# Patient Record
Sex: FEMALE | Race: WHITE | NOT HISPANIC OR LATINO | Employment: FULL TIME | URBAN - METROPOLITAN AREA
[De-identification: names, ages, dates, MRNs, and addresses within clinical notes are randomized per-mention and may not be internally consistent; named-entity substitution may affect disease eponyms.]

---

## 2020-11-11 ENCOUNTER — HISTORICAL (OUTPATIENT)
Dept: RESPIRATORY THERAPY | Facility: HOSPITAL | Age: 25
End: 2020-11-11

## 2023-08-30 DIAGNOSIS — Z34.90 PREGNANCY: Primary | ICD-10-CM

## 2023-09-25 NOTE — PROGRESS NOTES
Chief Complaint:  Initial Prenatal Visit    History of Present Illness:  Bri Eason is a 28 y.o. year old  presents for her new ob at 9w1d by LMP 23. She is doing well.       LMP: 23  Frequency: monthly  Cycle Length: 5 days   Flow: heavy to moderate  Intermenstrual Bleeding: No  Postcoital Bleeding: No  Dysmenorrhea: Yes. moderate  Sexually Active: yes   Dyspareunia: No  Contraception: no, pregnant  H/o STI: No   Last pap:  per Pt  H/o abnl pap: No   Colposcopy: no  Gardasil: n/a   MMG: n/a  H/o abnl MMG: n/a  Colonoscopy: n/a      Review of Systems:  General/Constitutional: Chills denies. Fatigue/weakness denies. Fever denies. Night sweats denies. Hot flashes denies.   Respiratory: Cough denies. Hemoptysis denies. SOB denies. Sputum production denies. Wheezing denies.   Cardiovascular: Chest pain denies. Dizziness denies. Palpitations denies. Swelling in hands/feet denies.    Gastrointestinal: Abdominal pain denies. Blood in stool denies. Constipation denies. Diarrhea denies. Heartburn denies. Nausea denies. Vomiting denies.   Genitourinary: Incontinence denies. Blood in urine denies. Frequent urination denies. Painful urination denies.  Urinary urgency denies.  Nocturia denies.   Gynecologic: Irregular menses denies. Heavy bleeding denies. Painful menses denies. Vaginal discharge denies. Vaginal odor denies. Vaginal itching denies. Vaginal lesion denies.  Pelvic pain denies. Decreased libido denies. Vulvar lesion denies. Prolapse of genital organs denies. Painful intercourse denies. Postcoital bleeding denies.   Psychiatric: Depression denies. Anxiety denies.     OB History    Para Term  AB Living   1 0 0 0 0 0   SAB IAB Ectopic Multiple Live Births   0 0 0 0 0      # 1 - Date: None, Sex: None, Weight: None, GA: None, Delivery: None, Apgar1: None, Apgar5: None, Living: None, Birth Comments: None      Past Medical History:   Diagnosis Date    Anxiety     Depression         Current Outpatient Medications:     FLUoxetine 20 MG capsule, Take 40 mg by mouth every evening., Disp: , Rfl:     PRENATAL 28 mg iron- 800 mcg Tab, Take 1 tablet by mouth., Disp: , Rfl:     Review of patient's allergies indicates:  No Known Allergies    Past Surgical History:   Procedure Laterality Date    TONSILLECTOMY      WISDOM TOOTH EXTRACTION  2018     Family History   Problem Relation Age of Onset    Breast cancer Mother 50    Seizures Sister     Cancer Maternal Grandfather     Stroke Paternal Grandfather     Ovarian cancer Neg Hx     Uterine cancer Neg Hx     Cervical cancer Neg Hx     Colon cancer Neg Hx      Social History     Socioeconomic History    Marital status:    Tobacco Use    Smoking status: Never     Passive exposure: Never    Smokeless tobacco: Never   Substance and Sexual Activity    Alcohol use: Not Currently    Drug use: Never    Sexual activity: Yes     Partners: Male     Birth control/protection: None       Physical Exam:  /70   Wt 103.7 kg (228 lb 9.6 oz)   LMP 07/24/2023     Chaperone: present.       General appearance: healthy, well-nourished and well-developed     Psychiatric: Orientation to time, place and person. Normal mood and affect and active, alert     Skin:   Appearance: no rashes or lesions.     Neck:   Neck: supple, FROM, trachea midline. and no masses   Thyroid: no enlargement or nodules and non-tender.       Cardiovascular:   Auscultation: RRR and no murmur.   Peripheral Vascular: no varicosities, LLE edema, RLE edema, calf tenderness, and palpable cord and pedal pulses intact.     Lungs:   Respiratory effort: no intercostal retractions or accessory muscle usage.   Auscultation: no wheezing, rales/crackles, or rhonchi and clear to auscultation.     Breast:   Inspection/Palpation: no tenderness, discrete/distinct masses, skin changes, or abnormal secretions. Nipple appearance normal.     Abdomen:   Auscultation/Inspection/Palpation: no hepatomegaly,  splenomegaly, masses, tenderness or CVA tenderness and soft, non-distended bowel sounds preset.    Hernia: no palpable hernias.     Female Genitalia:   Vulva: no masses, tenderness or lesions   Bladder/Urethra: no urethral discharge or mass, normal meatus, bladder non-distended.   Vagina: no tenderness, erythema, cystocele, rectocele, abnormal vaginal discharge or vesicle(s) or ulcers   Cervix: no discharge, no cervical lacerations noted or motion tenderness and grossly normal   Uterus: normal size and shape and midline, non-tender, and no uterine prolapse.   Adnexa/Parametria: no parametrial tenderness or mass, no adnexal tenderness or ovarian mass.     Lymph Nodes:   Palpation: non tender submandibular nodes, axillary nodes, or inguinal nodes.     Rectal Exam:   Rectum: normal perianal skin.       Assessment/Plan:  1. Maternal obesity affecting pregnancy, antepartum  Patient educated on risks and complications of obesity and pregnancy including but not limited to first trimester miscarriage recurrent miscarriages, congenital anomalies, HTN disorders, gestational diabetes, macrosomia, PTL&D, higher risk of fetal demise in-utero and higher risk of CS (and wound complication including infection breakdown) with obesity.    The [place] preventative task force placed the following recommendations in 2016 which ACOG supports regarding utilization of low dose 81mg ASA starting at 14 weeks and continuing through pregnancy in high risk pregnant women. The utilization of this has been shown to reduce the risk for preeclampsia by 24% in clinical trials and reduce the risk of  birth by 14% and IUGR about 20%.     Begin 81mg ASA @12-14wks    2. Anxiety in pregnancy, antepartum  Educated     Counseling information given for Sherie Garrison [facility] [phone]     HI & SI precautions  Desires medication     Rx: Vistaril 25 mg PRN    3. Bloating  Educated  Food diary, log symptoms    4. 9 weeks gestation of pregnancy  We discussed  diet/supplements and modifiable risk factors.     Patients advised to continue moderate physical activity.       Patients will report unusual headaches, visual disturbances, pelvic pain or cramping, vaginal bleeding, rupture of membranes, extreme swelling in hands or face, diminished urine volume, any prolonged illness or infection, or persistent symptoms of labor.     Discussed with patient on the uncertainty of COVID 19 in relation to pregnancy complications with patient and unborn fetus.  Advised to CDC guidelines of social distancing, patient to stay home as much as possible, and limit contact with others.  Travel restrictions discussed.  Patient to call office if she has a temperature over 100.4 cough shortness of breath or GI symptoms. Patients educated if she feels she is in an emergency to call 911.      Educated patient it is strongly advised by American Congress of Ob[employer]s [employer]ne[employer] as well as Society for Maternal Fetal [employer] Specialists to receive the COVID 19 vaccine. Educated patient that pregnant patients are at high risk of severe COVID 19 infections, including but not limited to death.     +FHT   GA: by LMP; 8w3d by US  NANCY: 5/4/2024  PNL, PNV, folic acid   Pap gc/cz/tv   RTC 1 month

## 2023-09-26 ENCOUNTER — INITIAL PRENATAL (OUTPATIENT)
Dept: OBSTETRICS AND GYNECOLOGY | Facility: CLINIC | Age: 28
End: 2023-09-26
Payer: COMMERCIAL

## 2023-09-26 VITALS
WEIGHT: 228.63 LBS | SYSTOLIC BLOOD PRESSURE: 126 MMHG | BODY MASS INDEX: 36.74 KG/M2 | DIASTOLIC BLOOD PRESSURE: 70 MMHG | HEIGHT: 66 IN

## 2023-09-26 DIAGNOSIS — F41.9 ANXIETY IN PREGNANCY, ANTEPARTUM: ICD-10-CM

## 2023-09-26 DIAGNOSIS — R14.0 BLOATING: ICD-10-CM

## 2023-09-26 DIAGNOSIS — O99.340 ANXIETY IN PREGNANCY, ANTEPARTUM: ICD-10-CM

## 2023-09-26 DIAGNOSIS — O99.210 MATERNAL OBESITY AFFECTING PREGNANCY, ANTEPARTUM: ICD-10-CM

## 2023-09-26 DIAGNOSIS — Z3A.09 9 WEEKS GESTATION OF PREGNANCY: ICD-10-CM

## 2023-09-26 DIAGNOSIS — Z34.91 FIRST TRIMESTER PREGNANCY: Primary | ICD-10-CM

## 2023-09-26 DIAGNOSIS — Z11.3 SCREENING EXAMINATION FOR VENEREAL DISEASE: ICD-10-CM

## 2023-09-26 LAB
BILIRUB UR QL STRIP: NEGATIVE
GLUCOSE UR QL STRIP: NEGATIVE
KETONES UR QL STRIP: NEGATIVE
LEUKOCYTE ESTERASE UR QL STRIP: NEGATIVE
PH, POC UA: 7
POC BLOOD, URINE: NEGATIVE
POC NITRATES, URINE: NEGATIVE
PROT UR QL STRIP: NEGATIVE
SP GR UR STRIP: 1.02 (ref 1–1.03)
UROBILINOGEN UR STRIP-ACNC: NORMAL (ref 0.1–1.1)

## 2023-09-26 PROCEDURE — 87661 TRICHOMONAS VAGINALIS AMPLIF: CPT | Performed by: OBSTETRICS & GYNECOLOGY

## 2023-09-26 PROCEDURE — 0500F INITIAL PRENATAL CARE VISIT: CPT | Mod: ,,, | Performed by: OBSTETRICS & GYNECOLOGY

## 2023-09-26 PROCEDURE — 87491 CHLMYD TRACH DNA AMP PROBE: CPT | Performed by: OBSTETRICS & GYNECOLOGY

## 2023-09-26 PROCEDURE — 76801 US OB/GYN EXTENDED PROCEDURE (VIEWPOINT): ICD-10-PCS | Mod: 26,,, | Performed by: OBSTETRICS & GYNECOLOGY

## 2023-09-26 PROCEDURE — 87591 N.GONORRHOEAE DNA AMP PROB: CPT | Performed by: OBSTETRICS & GYNECOLOGY

## 2023-09-26 PROCEDURE — 87088 URINE BACTERIA CULTURE: CPT | Performed by: OBSTETRICS & GYNECOLOGY

## 2023-09-26 PROCEDURE — 0500F PR INITIAL PRENATAL CARE VISIT: ICD-10-PCS | Mod: ,,, | Performed by: OBSTETRICS & GYNECOLOGY

## 2023-09-26 PROCEDURE — 76801 OB US < 14 WKS SINGLE FETUS: CPT | Mod: 26,,, | Performed by: OBSTETRICS & GYNECOLOGY

## 2023-09-26 RX ORDER — PNV NO.95/FERROUS FUM/FOLIC AC 28MG-0.8MG
1 TABLET ORAL
COMMUNITY
Start: 2023-09-03

## 2023-09-26 RX ORDER — FLUOXETINE HYDROCHLORIDE 20 MG/1
40 CAPSULE ORAL NIGHTLY
COMMUNITY
Start: 2023-06-30 | End: 2023-10-24

## 2023-09-26 RX ORDER — HYDROXYZINE PAMOATE 25 MG/1
25 CAPSULE ORAL EVERY 6 HOURS PRN
Qty: 30 CAPSULE | Refills: 0 | Status: SHIPPED | OUTPATIENT
Start: 2023-09-26 | End: 2024-02-20 | Stop reason: SDUPTHER

## 2023-09-27 LAB
C TRACH RRNA SPEC QL NAA+PROBE: NEGATIVE
N GONORRHOEA RRNA SPEC QL NAA+PROBE: NEGATIVE
SPECIMEN SOURCE: NORMAL
T VAGINALIS RRNA SPEC QL NAA+PROBE: NEGATIVE

## 2023-09-29 ENCOUNTER — LAB VISIT (OUTPATIENT)
Dept: LAB | Facility: HOSPITAL | Age: 28
End: 2023-09-29
Attending: OBSTETRICS & GYNECOLOGY
Payer: COMMERCIAL

## 2023-09-29 DIAGNOSIS — Z3A.09 9 WEEKS GESTATION OF PREGNANCY: ICD-10-CM

## 2023-09-29 LAB
ABO AND RH: NORMAL
ANTIBODY SCREEN: NORMAL
BACTERIA UR CULT: NO GROWTH
ERYTHROCYTE [DISTWIDTH] IN BLOOD BY AUTOMATED COUNT: 13.6 % (ref 11–14.5)
GLUCOSE 1H P 100 G GLC PO SERPL-MCNC: 102 MG/DL
HBV SURFACE AG SERPL QL IA: NEGATIVE
HBV SURFACE AG SERPL QL IA: NORMAL
HCT VFR BLD AUTO: 40.8 % (ref 36–48)
HGB BLD-MCNC: 13.1 G/DL (ref 11.8–16)
MCH RBC QN AUTO: 27 PG (ref 27–34)
MCHC RBC AUTO-ENTMCNC: 32.1 G/DL (ref 31–37)
MCV RBC AUTO: 84 FL (ref 79–99)
NRBC BLD AUTO-RTO: 0 %
PLATELET # BLD AUTO: 230 X10(3)/MCL (ref 140–371)
PMV BLD AUTO: 9.1 FL (ref 9.4–12.4)
RBC # BLD AUTO: 4.86 X10(6)/MCL (ref 4–5.1)
RUBELLA AB, IGG (OHS): 19 IU/ML
SPECIMEN OUTDATE: NORMAL
WBC # SPEC AUTO: 10.28 X10(3)/MCL (ref 4–11.5)

## 2023-09-29 PROCEDURE — 82950 GLUCOSE TEST: CPT

## 2023-09-29 PROCEDURE — 86900 BLOOD TYPING SEROLOGIC ABO: CPT | Performed by: OBSTETRICS & GYNECOLOGY

## 2023-09-29 PROCEDURE — 36415 COLL VENOUS BLD VENIPUNCTURE: CPT

## 2023-09-29 PROCEDURE — 86787 VARICELLA-ZOSTER ANTIBODY: CPT

## 2023-09-29 PROCEDURE — 86803 HEPATITIS C AB TEST: CPT

## 2023-09-29 PROCEDURE — 86762 RUBELLA ANTIBODY: CPT

## 2023-09-29 PROCEDURE — 87389 HIV-1 AG W/HIV-1&-2 AB AG IA: CPT

## 2023-09-29 PROCEDURE — 86780 TREPONEMA PALLIDUM: CPT

## 2023-09-29 PROCEDURE — 85027 COMPLETE CBC AUTOMATED: CPT

## 2023-09-29 PROCEDURE — 87340 HEPATITIS B SURFACE AG IA: CPT

## 2023-09-29 PROCEDURE — 83020 HEMOGLOBIN ELECTROPHORESIS: CPT

## 2023-09-30 LAB — T PALLIDUM AB SER QL: NONREACTIVE

## 2023-10-02 LAB
HCV AB SERPL QL IA: NONREACTIVE
HIV 1+2 AB+HIV1 P24 AG SERPL QL IA: NONREACTIVE

## 2023-10-04 LAB
HGB A MFR BLD ELPH: 97.5 % (ref 95.8–98)
HGB A2 MFR BLD ELPH: 2.5 % (ref 2–3.3)
HGB F MFR BLD ELPH: 0 % (ref 0–0.9)
HGB FRACT BLD ELPH-IMP: NORMAL
M HPLC HB VARIANT, B: NORMAL
VZV IGG SER IA-ACNC: 3.4
VZV IGG SER QL IA: POSITIVE

## 2023-10-23 NOTE — PROGRESS NOTES
"Chief Complaint:  Routine Prenatal Visit    History of Present Illness:  Bri Eason is a 28 y.o. year old  at 13w1d presents for her ob exam. She is doing well. No complaints today.       Review of Systems:  General/Constitutional: Fever denies. Headache denies.     Skin: Rash denies.   Respiratory: Cough denies. SOB denies. Wheezing denies .   Cardiovascular: Chest pain denies. Dizziness denies. Palpitations denies. Swelling in hands/feet denies.    Gastrointestinal: Abdominal pain denies. Constipation denies. Diarrhea denies. Heartburn denies. Nausea denies. Vomiting denies.  Genitourinary: Painful urination denies.   Gynecologic: Vaginal bleeding denies. Vaginal discharge Normal. Leaking amniotic fluid denies.  Contractions denies.  Psychiatric: Depressed mood denies. Suicidal thoughts denies.     OB History    Para Term  AB Living   1 0 0 0 0 0   SAB IAB Ectopic Multiple Live Births   0 0 0 0 0      # 1 - Date: None, Sex: None, Weight: None, GA: None, Delivery: None, Apgar1: None, Apgar5: None, Living: None, Birth Comments: None        Current Outpatient Medications:     hydrOXYzine pamoate (VISTARIL) 25 MG Cap, Take 1 capsule (25 mg total) by mouth every 6 (six) hours as needed (anxiety)., Disp: 30 capsule, Rfl: 0    PRENATAL 28 mg iron- 800 mcg Tab, Take 1 tablet by mouth., Disp: , Rfl:     FLUoxetine 20 MG capsule, Take 40 mg by mouth every evening., Disp: , Rfl:     Physical Exam:  /74   Temp 97.2 °F (36.2 °C)   Ht 5' 6" (1.676 m)   Wt 103.7 kg (228 lb 9.6 oz)   LMP 2023   BMI 36.90 kg/m²     Constitutional: General appearance: healthy, well-nourished and well-developed   Psychiatric:  Orientation to time, place and person. Normal mood and affect and active, alert   Abdomen: Auscultation/Inspection/Palpation: Gravid. No tenderness or masses. Soft, nondistended   Extremities: no CCE    FHT: 140      Assessment/Plan  1. Other obesity affecting pregnancy, " antepartum  Patient educated on risks and complications of obesity and pregnancy including but not limited to first trimester miscarriage recurrent miscarriages, congenital anomalies, HTN disorders, gestational diabetes, macrosomia, PTL&D, higher risk of fetal demise in-utero and higher risk of CS (and wound complication including infection breakdown) with obesity.     The [place] preventative task force placed the following recommendations in 2016 which ACOG supports regarding utilization of low dose 81mg ASA starting at 14 weeks and continuing through pregnancy in high risk pregnant women. The utilization of this has been shown to reduce the risk for preeclampsia by 24% in clinical trials and reduce the risk of  birth by 14% and IUGR about 20%.      Begin 81mg ASA @12-14wks    2. Anxiety in pregnancy, antepartum  Educated     Counseling information given for Sherie Garrison [facility] [phone]     HI & SI precautions  Vistaril 25 mg PRN    3. Rh negative state in antepartum period  Rhogam @28wks    4. Breast cancer in mother  Educated  Mother BRCA+. Pt received her results, pt is BRCA negative    5. 13 weeks gestation of pregnancy  Instructed on weight gain, healthy exercise, vitamins, avoidance of drugs tobacco and alcohol. Pain, fever, bleeding precautions.        Discussed with patient travel precautions.       Discussed with patient on the uncertainty of COVID 19 in relation to pregnancy complications with patient and unborn fetus.  Advised to CDC guidelines of social distancing, patient to stay home as much as possible, and limit contact with others.  Travel restrictions discussed.  Patient to call office if she has a temperature over 100.4 cough shortness of breath or GI symptoms. Patient educated if she feels she is in an emergency to call 911.        Educated patient it is strongly advised by American Congress of Obstetrics and Gyne[employer] as well as Society for Maternal Fetal Medicine Specialists to  receive the COVID 19 vaccine. Educated patient that pregnant patients are at high risk of severe COVID 19 infections, including but not limited to death.     Desires genetic testing, order given. Will call insurance and ivitae regarding price  RTC 1 mo

## 2023-10-24 ENCOUNTER — ROUTINE PRENATAL (OUTPATIENT)
Dept: OBSTETRICS AND GYNECOLOGY | Facility: CLINIC | Age: 28
End: 2023-10-24
Payer: COMMERCIAL

## 2023-10-24 VITALS
WEIGHT: 228.63 LBS | SYSTOLIC BLOOD PRESSURE: 128 MMHG | HEIGHT: 66 IN | TEMPERATURE: 97 F | BODY MASS INDEX: 36.74 KG/M2 | DIASTOLIC BLOOD PRESSURE: 74 MMHG

## 2023-10-24 DIAGNOSIS — F41.9 ANXIETY IN PREGNANCY, ANTEPARTUM: ICD-10-CM

## 2023-10-24 DIAGNOSIS — O99.340 ANXIETY IN PREGNANCY, ANTEPARTUM: ICD-10-CM

## 2023-10-24 DIAGNOSIS — Z3A.13 13 WEEKS GESTATION OF PREGNANCY: ICD-10-CM

## 2023-10-24 DIAGNOSIS — E66.8 OTHER OBESITY AFFECTING PREGNANCY, ANTEPARTUM: ICD-10-CM

## 2023-10-24 DIAGNOSIS — O26.899 RH NEGATIVE STATE IN ANTEPARTUM PERIOD: ICD-10-CM

## 2023-10-24 DIAGNOSIS — O99.210 OTHER OBESITY AFFECTING PREGNANCY, ANTEPARTUM: ICD-10-CM

## 2023-10-24 DIAGNOSIS — Z67.91 RH NEGATIVE STATE IN ANTEPARTUM PERIOD: ICD-10-CM

## 2023-10-24 LAB
BILIRUB UR QL STRIP: NORMAL
GLUCOSE UR QL STRIP: NEGATIVE
KETONES UR QL STRIP: NORMAL
LEUKOCYTE ESTERASE UR QL STRIP: NEGATIVE
PH, POC UA: NORMAL
POC BLOOD, URINE: NORMAL
POC NITRATES, URINE: NEGATIVE
PROT UR QL STRIP: NEGATIVE
SP GR UR STRIP: NORMAL (ref 1–1.03)
UROBILINOGEN UR STRIP-ACNC: NORMAL (ref 0.3–2.2)

## 2023-10-24 PROCEDURE — 0502F SUBSEQUENT PRENATAL CARE: CPT | Mod: CPTII,,, | Performed by: OBSTETRICS & GYNECOLOGY

## 2023-10-24 PROCEDURE — 0502F PR SUBSEQUENT PRENATAL CARE: ICD-10-PCS | Mod: CPTII,,, | Performed by: OBSTETRICS & GYNECOLOGY

## 2023-11-28 ENCOUNTER — ROUTINE PRENATAL (OUTPATIENT)
Dept: OBSTETRICS AND GYNECOLOGY | Facility: CLINIC | Age: 28
End: 2023-11-28
Payer: COMMERCIAL

## 2023-11-28 VITALS — WEIGHT: 231.69 LBS | BODY MASS INDEX: 37.4 KG/M2 | DIASTOLIC BLOOD PRESSURE: 70 MMHG | SYSTOLIC BLOOD PRESSURE: 118 MMHG

## 2023-11-28 DIAGNOSIS — E66.8 OTHER OBESITY AFFECTING PREGNANCY, ANTEPARTUM: Primary | ICD-10-CM

## 2023-11-28 DIAGNOSIS — Z3A.18 18 WEEKS GESTATION OF PREGNANCY: ICD-10-CM

## 2023-11-28 DIAGNOSIS — O26.899 RH NEGATIVE STATE IN ANTEPARTUM PERIOD: ICD-10-CM

## 2023-11-28 DIAGNOSIS — Z67.91 RH NEGATIVE STATE IN ANTEPARTUM PERIOD: ICD-10-CM

## 2023-11-28 DIAGNOSIS — O99.340 ANXIETY IN PREGNANCY, ANTEPARTUM: ICD-10-CM

## 2023-11-28 DIAGNOSIS — F41.9 ANXIETY IN PREGNANCY, ANTEPARTUM: ICD-10-CM

## 2023-11-28 DIAGNOSIS — O99.210 OTHER OBESITY AFFECTING PREGNANCY, ANTEPARTUM: Primary | ICD-10-CM

## 2023-11-28 PROCEDURE — 90471 IMMUNIZATION ADMIN: CPT | Mod: ,,, | Performed by: OBSTETRICS & GYNECOLOGY

## 2023-11-28 PROCEDURE — 0502F PR SUBSEQUENT PRENATAL CARE: ICD-10-PCS | Mod: CPTII,,, | Performed by: OBSTETRICS & GYNECOLOGY

## 2023-11-28 PROCEDURE — 90686 FLU VACCINE (QUAD) GREATER THAN OR EQUAL TO 3YO PRESERVATIVE FREE IM: ICD-10-PCS | Mod: ,,, | Performed by: OBSTETRICS & GYNECOLOGY

## 2023-11-28 PROCEDURE — 90471 FLU VACCINE (QUAD) GREATER THAN OR EQUAL TO 3YO PRESERVATIVE FREE IM: ICD-10-PCS | Mod: ,,, | Performed by: OBSTETRICS & GYNECOLOGY

## 2023-11-28 PROCEDURE — 0502F SUBSEQUENT PRENATAL CARE: CPT | Mod: CPTII,,, | Performed by: OBSTETRICS & GYNECOLOGY

## 2023-11-28 PROCEDURE — 90686 IIV4 VACC NO PRSV 0.5 ML IM: CPT | Mod: ,,, | Performed by: OBSTETRICS & GYNECOLOGY

## 2023-11-28 NOTE — PROGRESS NOTES
Chief Complaint:  Routine Prenatal Visit (18.1, Denies problems today )    History of Present Illness:  Bri Eason is a 28 y.o. year old  at 18w1d presents for her ob exam. She is doing well. No complaints today.       Review of Systems:  General/Constitutional: Fever denies. Headache denies.     Skin: Rash denies.   Respiratory: Cough denies. SOB denies. Wheezing denies .   Cardiovascular: Chest pain denies. Dizziness denies. Palpitations denies. Swelling in hands/feet denies.    Gastrointestinal: Abdominal pain denies. Constipation denies. Diarrhea denies. Heartburn denies. Nausea denies. Vomiting denies.  Genitourinary: Painful urination denies.   Gynecologic: Vaginal bleeding denies. Vaginal discharge Normal. Leaking amniotic fluid denies.  Contractions denies.  Psychiatric: Depressed mood denies. Suicidal thoughts denies.     OB History    Para Term  AB Living   1 0 0 0 0 0   SAB IAB Ectopic Multiple Live Births   0 0 0 0 0      # 1 - Date: None, Sex: None, Weight: None, GA: None, Delivery: None, Apgar1: None, Apgar5: None, Living: None, Birth Comments: None        Current Outpatient Medications:     hydrOXYzine pamoate (VISTARIL) 25 MG Cap, Take 1 capsule (25 mg total) by mouth every 6 (six) hours as needed (anxiety)., Disp: 30 capsule, Rfl: 0    PRENATAL 28 mg iron- 800 mcg Tab, Take 1 tablet by mouth., Disp: , Rfl:     Physical Exam:  /70   Wt 105.1 kg (231 lb 11.3 oz)   LMP 2023   BMI 37.40 kg/m²     Constitutional: General appearance: healthy, well-nourished and well-developed   Psychiatric:  Orientation to time, place and person. Normal mood and affect and active, alert   Abdomen: Auscultation/Inspection/Palpation: Gravid. No tenderness or masses. Soft, nondistended   Extremities: no CCE    FHT: 150      Assessment/Plan  1. Other obesity affecting pregnancy, antepartum  Patient educated on risks and complications of obesity and pregnancy including but not limited to  first trimester miscarriage recurrent miscarriages, congenital anomalies, HTN disorders, gestational diabetes, macrosomia, PTL&D, higher risk of fetal demise in-utero and higher risk of CS (and wound complication including infection breakdown) with obesity.     The [place] preventative task force placed the following recommendations in 2016 which ACOG supports regarding utilization of low dose 81mg ASA starting at 14 weeks and continuing through pregnancy in high risk pregnant women. The utilization of this has been shown to reduce the risk for preeclampsia by 24% in clinical trials and reduce the risk of  birth by 14% and IUGR about 20%.     Cont ASA 81mg    2. Anxiety in pregnancy, antepartum  Educated     Counseling information given for Sherie Garrison [facility] [phone]     HI & SI precautions  Vistaril 25 mg PRN    3. Rh negative state in antepartum period  Rhogam @28wks     4. 18 weeks gestation of pregnancy  Instructed on weight gain, healthy exercise, vitamins, avoidance of drugs tobacco and alcohol. Pain, fever, bleeding precautions.        Discussed with patient travel precautions.       Discussed with patient on the uncertainty of COVID 19 in relation to pregnancy complications with patient and unborn fetus.  Advised to CDC guidelines of social distancing, patient to stay home as much as possible, and limit contact with others.  Travel restrictions discussed.  Patient to call office if she has a temperature over 100.4 cough shortness of breath or GI symptoms. Patient educated if she feels she is in an emergency to call 911.        Educated patient it is strongly advised by American Congress of Obstetrics and Gynecology as well as Society for Maternal Fetal Medicine Specialists to receive the COVID 19 vaccine. Educated patient that pregnant patients are at high risk of severe COVID 19 infections, including but not limited to death.     Anatomy us 23   Declined genetic testing  RTC 4 weeks

## 2023-12-07 ENCOUNTER — HOSPITAL ENCOUNTER (OUTPATIENT)
Dept: RADIOLOGY | Facility: HOSPITAL | Age: 28
Discharge: HOME | End: 2023-12-07
Attending: OBSTETRICS & GYNECOLOGY
Payer: COMMERCIAL

## 2023-12-07 DIAGNOSIS — Z3A.13 13 WEEKS GESTATION OF PREGNANCY: ICD-10-CM

## 2023-12-07 PROCEDURE — 76805 OB US >/= 14 WKS SNGL FETUS: CPT | Mod: TC

## 2023-12-11 ENCOUNTER — PATIENT MESSAGE (OUTPATIENT)
Dept: OTHER | Facility: OTHER | Age: 28
End: 2023-12-11
Payer: COMMERCIAL

## 2023-12-22 NOTE — PROGRESS NOTES
Chief Complaint:  Routine Prenatal Visit (22.2 weeks. )    History of Present Illness:  Bri Eason is a 28 y.o. year old  at 22w2d presents for her ob exam. She is doing well. No complaints today.       Review of Systems:  General/Constitutional: Fever denies. Headache denies.     Skin: Rash denies.   Respiratory: Cough denies. SOB denies. Wheezing denies .   Cardiovascular: Chest pain denies. Dizziness denies. Palpitations denies. Swelling in hands/feet denies.    Gastrointestinal: Abdominal pain denies. Constipation denies. Diarrhea denies. Heartburn denies. Nausea denies. Vomiting denies.  Genitourinary: Painful urination denies.   Gynecologic: Vaginal bleeding denies. Vaginal discharge Normal. Leaking amniotic fluid denies.  Contractions denies.  Psychiatric: Depressed mood denies. Suicidal thoughts denies.       Current Outpatient Medications:     hydrOXYzine pamoate (VISTARIL) 25 MG Cap, Take 1 capsule (25 mg total) by mouth every 6 (six) hours as needed (anxiety)., Disp: 30 capsule, Rfl: 0    PRENATAL 28 mg iron- 800 mcg Tab, Take 1 tablet by mouth., Disp: , Rfl:     Physical Exam:  /74   Wt 107 kg (236 lb)   LMP 2023   BMI 38.09 kg/m²     Constitutional: General appearance: healthy, well-nourished and well-developed   Psychiatric:  Orientation to time, place and person. Normal mood and affect and active, alert   Abdomen: Auscultation/Inspection/Palpation: Gravid. No tenderness or masses. Soft, nondistended   Extremities: no CCE    FHT: 140      Assessment/Plan  1. Other obesity affecting pregnancy, antepartum  Patient educated on risks and complications of obesity and pregnancy including but not limited to first trimester miscarriage recurrent miscarriages, congenital anomalies, HTN disorders, gestational diabetes, macrosomia, PTL&D, higher risk of fetal demise in-utero and higher risk of CS (and wound complication including infection breakdown) with obesity.     The [place] preventative  task force placed the following recommendations in 2016 which ACOG supports regarding utilization of low dose 81mg ASA starting at 14 weeks and continuing through pregnancy in high risk pregnant women. The utilization of this has been shown to reduce the risk for preeclampsia by 24% in clinical trials and reduce the risk of  birth by 14% and IUGR about 20%.      Cont ASA 81mg    2. Assess fetal growth  Anatomy US 23: Fetal measurements: Biparietal diameter is 4.8 cm/20 weeks 4 days/90th percentile, head circumference is 19.0 cm/21 weeks 2 days/98th percentile, abdominal circumference is 15.6 cm/20 weeks 5 days/85th percentile, femur length is 3.2 cm/19 weeks 6 days/58th percentile and the estimated fetal weight is 354 g/20 weeks 3 days/93rd percentile (based on estimated gestational age).     Repeat growth @28wks    3. Anxiety in pregnancy, antepartum  Educated     Counseling information given for Sherie Garrison [facility] [phone]     HI & SI precautions  Vistaril 25 mg PRN    4. Rh negative state in antepartum period  Rhogam @28wks     5. 22 weeks gestation of pregnancy  Instructed on weight gain, healthy exercise, vitamins, avoidance of drugs tobacco and alcohol. Pain, fever, bleeding precautions.        Discussed with patient travel precautions.    RTC 3 weeks

## 2023-12-27 ENCOUNTER — ROUTINE PRENATAL (OUTPATIENT)
Dept: OBSTETRICS AND GYNECOLOGY | Facility: CLINIC | Age: 28
End: 2023-12-27
Payer: COMMERCIAL

## 2023-12-27 VITALS — DIASTOLIC BLOOD PRESSURE: 74 MMHG | SYSTOLIC BLOOD PRESSURE: 118 MMHG | BODY MASS INDEX: 38.09 KG/M2 | WEIGHT: 236 LBS

## 2023-12-27 DIAGNOSIS — Z67.91 RH NEGATIVE STATE IN ANTEPARTUM PERIOD: ICD-10-CM

## 2023-12-27 DIAGNOSIS — O26.899 RH NEGATIVE STATE IN ANTEPARTUM PERIOD: ICD-10-CM

## 2023-12-27 DIAGNOSIS — Z3A.22 22 WEEKS GESTATION OF PREGNANCY: ICD-10-CM

## 2023-12-27 DIAGNOSIS — Z36.89 ENCOUNTER FOR ULTRASOUND TO ASSESS FETAL GROWTH: ICD-10-CM

## 2023-12-27 DIAGNOSIS — F41.9 ANXIETY IN PREGNANCY, ANTEPARTUM: ICD-10-CM

## 2023-12-27 DIAGNOSIS — O99.210 OTHER OBESITY AFFECTING PREGNANCY, ANTEPARTUM: Primary | ICD-10-CM

## 2023-12-27 DIAGNOSIS — E66.8 OTHER OBESITY AFFECTING PREGNANCY, ANTEPARTUM: Primary | ICD-10-CM

## 2023-12-27 DIAGNOSIS — O99.340 ANXIETY IN PREGNANCY, ANTEPARTUM: ICD-10-CM

## 2023-12-27 LAB
BILIRUB SERPL-MCNC: NORMAL MG/DL
BLOOD, POC UA: NORMAL
GLUCOSE UR QL STRIP: NEGATIVE
KETONES UR QL STRIP: NORMAL
LEUKOCYTE ESTERASE URINE, POC: NEGATIVE
NITRITE, POC UA: NEGATIVE
PH, POC UA: NORMAL
PROTEIN, POC: POSITIVE
SPECIFIC GRAVITY, POC UA: NORMAL
UROBILINOGEN, POC UA: NORMAL

## 2023-12-27 PROCEDURE — 0502F PR SUBSEQUENT PRENATAL CARE: ICD-10-PCS | Mod: CPTII,,, | Performed by: OBSTETRICS & GYNECOLOGY

## 2023-12-27 PROCEDURE — 0502F SUBSEQUENT PRENATAL CARE: CPT | Mod: CPTII,,, | Performed by: OBSTETRICS & GYNECOLOGY

## 2024-01-08 ENCOUNTER — PATIENT MESSAGE (OUTPATIENT)
Dept: OTHER | Facility: OTHER | Age: 29
End: 2024-01-08
Payer: COMMERCIAL

## 2024-01-22 ENCOUNTER — PATIENT MESSAGE (OUTPATIENT)
Dept: OTHER | Facility: OTHER | Age: 29
End: 2024-01-22
Payer: COMMERCIAL

## 2024-01-22 NOTE — PROGRESS NOTES
Chief Complaint:  Routine Prenatal Visit    History of Present Illness:  Bri Eason is a 28 y.o. year old  at 26w1d presents for her ob exam. She is doing well. +fetal movement. Denies vaginal bleeding, vaginal discharge, LOF or contractions. Negative preeclampsia ROS.      Review of Systems:  General/Constitutional: Fever denies. Headache denies.    Skin: Rash denies.   Respiratory: Cough denies. SOB denies. Wheezing denies .   Cardiovascular: Chest pain denies. Dizziness denies. Palpitations denies. Swelling in hands/feet denies.    Gastrointestinal: Abdominal pain denies. Constipation denies. Diarrhea denies. Heartburn denies. Nausea denies. Vomiting denies.    Genitourinary: Painful urination denies.   Gynecologic: Vaginal bleeding denies. Vaginal discharge denies. Leaking amniotic fluid denies. Contractions denies.    Psychiatric: Depressed mood denies. Suicidal thoughts denies.       Current Outpatient Medications:     hydrOXYzine pamoate (VISTARIL) 25 MG Cap, Take 1 capsule (25 mg total) by mouth every 6 (six) hours as needed (anxiety)., Disp: 30 capsule, Rfl: 0    PRENATAL 28 mg iron- 800 mcg Tab, Take 1 tablet by mouth., Disp: , Rfl:       Physical Exam:  /80   Wt 110.7 kg (244 lb)   LMP 2023   BMI 39.38 kg/m²     Constitutional: General appearance: healthy, well-nourished and well-developed   Psychiatric:  Orientation to time, place and person. Normal mood and affect and active, alert   Abdomen: Auscultation/Inspection/Palpation: Gravid. No tenderness or masses. Soft, nondistended   Extremities: no CCE       FH: 26cm  FHT: 130      Assessment/Plan  1. Other obesity affecting pregnancy, antepartum  Patient educated on risks and complications of obesity and pregnancy including but not limited to first trimester miscarriage recurrent miscarriages, congenital anomalies, HTN disorders, gestational diabetes, macrosomia, PTL&D, higher risk of fetal demise in-utero and higher risk of CS  (and wound complication including infection breakdown) with obesity.     The [place] preventative task force placed the following recommendations in 2016 which ACOG supports regarding utilization of low dose 81mg ASA starting at 14 weeks and continuing through pregnancy in high risk pregnant women. The utilization of this has been shown to reduce the risk for preeclampsia by 24% in clinical trials and reduce the risk of  birth by 14% and IUGR about 20%.      Cont ASA 81mg     2. Encounter for ultrasound to assess fetal growth  Anatomy US 23: Fetal measurements: Biparietal diameter is 4.8 cm/20 weeks 4 days/90th percentile, head circumference is 19.0 cm/21 weeks 2 days/98th percentile, abdominal circumference is 15.6 cm/20 weeks 5 days/85th percentile, femur length is 3.2 cm/19 weeks 6 days/58th percentile and the estimated fetal weight is 354 g/20 weeks 3 days/93rd percentile (based on estimated gestational age).      Repeat growth @28wks     3. Anxiety in pregnancy, antepartum  Educated     Counseling information given for Sherie Garrison [facility] [phone]     HI & SI precautions  Vistaril 25 mg PRN     4. Rh negative state in antepartum period  Rhogam @28wks    5. 26 weeks gestation of pregnancy  Discussed that should any of the following symptoms occur during pregnancy, patient should contact the office immediately or go to the ER after business hours: spotting or bleeding, cramping, painful urination, severe vomiting, pain in one leg or calf, sudden gush of fluid, decrease or absence of fetal movement, sudden weight gain, periorbital or facial swelling, headache with visual changes and/or photophobia.       Discussed with patient travel precautions.    Vera  RTC 3 weeks for growth to evaluate fetal growth

## 2024-01-23 ENCOUNTER — ROUTINE PRENATAL (OUTPATIENT)
Dept: OBSTETRICS AND GYNECOLOGY | Facility: CLINIC | Age: 29
End: 2024-01-23
Payer: COMMERCIAL

## 2024-01-23 ENCOUNTER — LAB VISIT (OUTPATIENT)
Dept: LAB | Facility: HOSPITAL | Age: 29
End: 2024-01-23
Attending: OBSTETRICS & GYNECOLOGY
Payer: COMMERCIAL

## 2024-01-23 VITALS — DIASTOLIC BLOOD PRESSURE: 80 MMHG | SYSTOLIC BLOOD PRESSURE: 120 MMHG | WEIGHT: 244 LBS | BODY MASS INDEX: 39.38 KG/M2

## 2024-01-23 DIAGNOSIS — F41.9 ANXIETY IN PREGNANCY, ANTEPARTUM: ICD-10-CM

## 2024-01-23 DIAGNOSIS — O26.899 RH NEGATIVE STATE IN ANTEPARTUM PERIOD: ICD-10-CM

## 2024-01-23 DIAGNOSIS — Z36.89 ENCOUNTER FOR ULTRASOUND TO ASSESS FETAL GROWTH: ICD-10-CM

## 2024-01-23 DIAGNOSIS — Z3A.26 26 WEEKS GESTATION OF PREGNANCY: ICD-10-CM

## 2024-01-23 DIAGNOSIS — E66.8 OTHER OBESITY AFFECTING PREGNANCY, ANTEPARTUM: Primary | ICD-10-CM

## 2024-01-23 DIAGNOSIS — O99.210 OTHER OBESITY AFFECTING PREGNANCY, ANTEPARTUM: Primary | ICD-10-CM

## 2024-01-23 DIAGNOSIS — Z67.91 RH NEGATIVE STATE IN ANTEPARTUM PERIOD: ICD-10-CM

## 2024-01-23 DIAGNOSIS — O99.340 ANXIETY IN PREGNANCY, ANTEPARTUM: ICD-10-CM

## 2024-01-23 LAB
BILIRUB UR QL STRIP: NORMAL
GLUCOSE 1H P 100 G GLC PO SERPL-MCNC: 107 MG/DL
GLUCOSE UR QL STRIP: NEGATIVE
KETONES UR QL STRIP: NORMAL
LEUKOCYTE ESTERASE UR QL STRIP: NEGATIVE
PH, POC UA: NORMAL
POC BLOOD, URINE: NORMAL
POC NITRATES, URINE: NEGATIVE
PROT UR QL STRIP: NEGATIVE
SP GR UR STRIP: NORMAL (ref 1–1.03)
UROBILINOGEN UR STRIP-ACNC: NORMAL (ref 0.3–2.2)

## 2024-01-23 PROCEDURE — 82950 GLUCOSE TEST: CPT

## 2024-01-23 PROCEDURE — 36415 COLL VENOUS BLD VENIPUNCTURE: CPT

## 2024-01-23 PROCEDURE — 0502F SUBSEQUENT PRENATAL CARE: CPT | Mod: CPTII,,, | Performed by: OBSTETRICS & GYNECOLOGY

## 2024-02-05 ENCOUNTER — PATIENT MESSAGE (OUTPATIENT)
Dept: OTHER | Facility: OTHER | Age: 29
End: 2024-02-05
Payer: COMMERCIAL

## 2024-02-05 NOTE — PROGRESS NOTES
"Chief Complaint:  Routine Prenatal Visit    History of Present Illness:  Bri Eason is a 28 y.o. year old  at 28w1d presents for her ob exam. She is doing well. +fetal movement. Denies vaginal bleeding, vaginal discharge, LOF or contractions. Negative preeclampsia ROS.      Review of Systems:  General/Constitutional: Fever denies. Headache denies.    Skin: Rash denies.   Respiratory: Cough denies. SOB denies. Wheezing denies .   Cardiovascular: Chest pain denies. Dizziness denies. Palpitations denies. Swelling in hands/feet denies.    Gastrointestinal: Abdominal pain denies. Constipation denies. Diarrhea denies. Heartburn denies. Nausea denies. Vomiting denies.    Genitourinary: Painful urination denies.   Gynecologic: Vaginal bleeding denies. Vaginal discharge denies. Leaking amniotic fluid denies. Contractions denies.    Psychiatric: Depressed mood denies. Suicidal thoughts denies.       Current Outpatient Medications:     hydrOXYzine pamoate (VISTARIL) 25 MG Cap, Take 1 capsule (25 mg total) by mouth every 6 (six) hours as needed (anxiety)., Disp: 30 capsule, Rfl: 0    PRENATAL 28 mg iron- 800 mcg Tab, Take 1 tablet by mouth., Disp: , Rfl:       Physical Exam:  /74   Temp 96.8 °F (36 °C)   Ht 5' 6" (1.676 m)   Wt 113.4 kg (250 lb)   LMP 2023   BMI 40.35 kg/m²     Constitutional: General appearance: healthy, well-nourished and well-developed   Psychiatric:  Orientation to time, place and person. Normal mood and affect and active, alert   Abdomen: Auscultation/Inspection/Palpation: Gravid. No tenderness or masses. Soft, nondistended   Extremities: no CCE       FH: 28cm  FHT: 140      Assessment/Plan  1. Other obesity affecting pregnancy, antepartum  Patient educated on risks and complications of obesity and pregnancy including but not limited to first trimester miscarriage recurrent miscarriages, congenital anomalies, HTN disorders, gestational diabetes, macrosomia, PTL&D, higher risk of " fetal demise in-utero and higher risk of CS (and wound complication including infection breakdown) with obesity.     The [place] preventative task force placed the following recommendations in 2016 which ACOG supports regarding utilization of low dose 81mg ASA starting at 14 weeks and continuing through pregnancy in high risk pregnant women. The utilization of this has been shown to reduce the risk for preeclampsia by 24% in clinical trials and reduce the risk of  birth by 14% and IUGR about 20%.      Cont ASA 81mg     2. Encounter for ultrasound to assess fetal growth  Anatomy US 23: Fetal measurements: Biparietal diameter is 4.8 cm/20 weeks 4 days/90th percentile, head circumference is 19.0 cm/21 weeks 2 days/98th percentile, abdominal circumference is 15.6 cm/20 weeks 5 days/85th percentile, femur length is 3.2 cm/19 weeks 6 days/58th percentile and the estimated fetal weight is 354 g/20 weeks 3 days/93rd percentile (based on estimated gestational age).     Growth Scan: 24  EFW: 195g  EUA: 28w4d  66th percentile    Biophysical Profile:  2/2 - Breathing  2/2 - Tone  2/2 - Movement  2/2 - Fluid    Score: 8/8     3. Anxiety in pregnancy, antepartum  Educated     Counseling information given for [facility] [phone]     HI & SI precautions  Vistaril 25 mg PRN     4. Rh negative state in antepartum period  Rhogam     5. 28 weeks gestation of pregnancy  Discussed that should any of the following symptoms occur during pregnancy, patient should contact the office immediately or go to the ER after business hours: spotting or bleeding, cramping, painful urination, severe vomiting, pain in one leg or calf, sudden gush of fluid, decrease or absence of fetal movement, sudden weight gain, periorbital or facial swelling, headache with visual changes and/or photophobia.       Discussed with patient travel precautions.    Rhogam  Tdap administered today  RTC 3 weeks

## 2024-02-06 ENCOUNTER — ROUTINE PRENATAL (OUTPATIENT)
Dept: OBSTETRICS AND GYNECOLOGY | Facility: CLINIC | Age: 29
End: 2024-02-06
Payer: COMMERCIAL

## 2024-02-06 ENCOUNTER — LAB VISIT (OUTPATIENT)
Dept: LAB | Facility: HOSPITAL | Age: 29
End: 2024-02-06
Attending: OBSTETRICS & GYNECOLOGY
Payer: COMMERCIAL

## 2024-02-06 ENCOUNTER — INFUSION (OUTPATIENT)
Dept: INFUSION THERAPY | Facility: HOSPITAL | Age: 29
End: 2024-02-06
Attending: OBSTETRICS & GYNECOLOGY
Payer: COMMERCIAL

## 2024-02-06 VITALS
BODY MASS INDEX: 40.18 KG/M2 | TEMPERATURE: 97 F | DIASTOLIC BLOOD PRESSURE: 74 MMHG | HEIGHT: 66 IN | SYSTOLIC BLOOD PRESSURE: 122 MMHG | WEIGHT: 250 LBS

## 2024-02-06 DIAGNOSIS — Z23 NEED FOR TDAP VACCINATION: ICD-10-CM

## 2024-02-06 DIAGNOSIS — O26.899 RH NEGATIVE STATE IN ANTEPARTUM PERIOD: ICD-10-CM

## 2024-02-06 DIAGNOSIS — Z67.91 RH NEGATIVE STATE IN ANTEPARTUM PERIOD: ICD-10-CM

## 2024-02-06 DIAGNOSIS — Z3A.28 28 WEEKS GESTATION OF PREGNANCY: ICD-10-CM

## 2024-02-06 DIAGNOSIS — O26.899 RH NEGATIVE STATE IN ANTEPARTUM PERIOD: Primary | ICD-10-CM

## 2024-02-06 DIAGNOSIS — O99.210 OTHER OBESITY AFFECTING PREGNANCY, ANTEPARTUM: Primary | ICD-10-CM

## 2024-02-06 DIAGNOSIS — Z3A.26 26 WEEKS GESTATION OF PREGNANCY: ICD-10-CM

## 2024-02-06 DIAGNOSIS — F41.9 ANXIETY IN PREGNANCY, ANTEPARTUM: ICD-10-CM

## 2024-02-06 DIAGNOSIS — Z67.91 RH NEGATIVE STATE IN ANTEPARTUM PERIOD: Primary | ICD-10-CM

## 2024-02-06 DIAGNOSIS — O99.340 ANXIETY IN PREGNANCY, ANTEPARTUM: ICD-10-CM

## 2024-02-06 DIAGNOSIS — Z36.89 ENCOUNTER FOR ULTRASOUND TO ASSESS FETAL GROWTH: ICD-10-CM

## 2024-02-06 DIAGNOSIS — E66.8 OTHER OBESITY AFFECTING PREGNANCY, ANTEPARTUM: Primary | ICD-10-CM

## 2024-02-06 LAB
ABO AND RH: NORMAL
ANTIBODY SCREEN: NORMAL
BILIRUB UR QL STRIP: NORMAL
ERYTHROCYTE [DISTWIDTH] IN BLOOD BY AUTOMATED COUNT: 14.2 % (ref 11–14.5)
GLUCOSE UR QL STRIP: NEGATIVE
HCT VFR BLD AUTO: 36.9 % (ref 36–48)
HGB BLD-MCNC: 12 G/DL (ref 11.8–16)
KETONES UR QL STRIP: NORMAL
LEUKOCYTE ESTERASE UR QL STRIP: NEGATIVE
MCH RBC QN AUTO: 27.5 PG (ref 27–34)
MCHC RBC AUTO-ENTMCNC: 32.5 G/DL (ref 31–37)
MCV RBC AUTO: 84.6 FL (ref 79–99)
NRBC BLD AUTO-RTO: 0 %
PH, POC UA: NORMAL
PLATELET # BLD AUTO: 245 X10(3)/MCL (ref 140–371)
PMV BLD AUTO: 9.2 FL (ref 9.4–12.4)
POC BLOOD, URINE: NORMAL
POC NITRATES, URINE: NEGATIVE
PROT UR QL STRIP: NEGATIVE
RBC # BLD AUTO: 4.36 X10(6)/MCL (ref 4–5.1)
RH IMMUNE GLOBULIN: NORMAL
SP GR UR STRIP: NORMAL (ref 1–1.03)
SPECIMEN OUTDATE: NORMAL
T PALLIDUM AB SER QL: NONREACTIVE
UROBILINOGEN UR STRIP-ACNC: NORMAL (ref 0.3–2.2)
WBC # SPEC AUTO: 9.75 X10(3)/MCL (ref 4–11.5)

## 2024-02-06 PROCEDURE — 86901 BLOOD TYPING SEROLOGIC RH(D): CPT | Performed by: OBSTETRICS & GYNECOLOGY

## 2024-02-06 PROCEDURE — 76816 OB US FOLLOW-UP PER FETUS: CPT | Mod: ,,, | Performed by: OBSTETRICS & GYNECOLOGY

## 2024-02-06 PROCEDURE — 85027 COMPLETE CBC AUTOMATED: CPT

## 2024-02-06 PROCEDURE — 90471 IMMUNIZATION ADMIN: CPT | Mod: ,,, | Performed by: OBSTETRICS & GYNECOLOGY

## 2024-02-06 PROCEDURE — 63600519 RHOGAM PHARM REV CODE 636 ALT 250 W HCPCS: Performed by: OBSTETRICS & GYNECOLOGY

## 2024-02-06 PROCEDURE — 90715 TDAP VACCINE 7 YRS/> IM: CPT | Mod: ,,, | Performed by: OBSTETRICS & GYNECOLOGY

## 2024-02-06 PROCEDURE — 76819 FETAL BIOPHYS PROFIL W/O NST: CPT | Mod: ,,, | Performed by: OBSTETRICS & GYNECOLOGY

## 2024-02-06 PROCEDURE — 36415 COLL VENOUS BLD VENIPUNCTURE: CPT

## 2024-02-06 PROCEDURE — 86780 TREPONEMA PALLIDUM: CPT

## 2024-02-06 PROCEDURE — 0502F SUBSEQUENT PRENATAL CARE: CPT | Mod: CPTII,,, | Performed by: OBSTETRICS & GYNECOLOGY

## 2024-02-06 PROCEDURE — 96372 THER/PROPH/DIAG INJ SC/IM: CPT

## 2024-02-06 RX ADMIN — HUMAN RHO(D) IMMUNE GLOBULIN 300 MCG: 300 INJECTION, SOLUTION INTRAMUSCULAR at 10:02

## 2024-02-06 NOTE — PLAN OF CARE
PT IN ROOM IN STABLE CONDITION, RHOGAM GIVEN TO LT DORSAL GLT. PT TOLERATED WELL. INSTRUCTED PT TO WAIT X15 MINUTES SHOT TIME. INSTRUCTED PATIENT TO CALL PCP IF NEEDED.

## 2024-02-14 NOTE — PROGRESS NOTES
Chief Complaint:  Routine Prenatal Visit    History of Present Illness:  Bri Eason is a 29 y.o. year old  at 30w1d presents for her ob exam. She is doing well. +fetal movement. Denies vaginal bleeding, vaginal discharge, LOF or contractions. Negative preeclampsia ROS.    States she's been sick with a cold. Currently taking Tylenol cold and flu. Denies CP SOB    C/o increase in anxiety and racing HR none currently. States increase in recent stress, has a graduate paper due yesterday as well planning a work event for Saturday. Has PMH of anxiety, no current medications.   Denies any chest pain or SOB.       Review of Systems:  General/Constitutional: Fever denies. Headache denies.    Skin: Rash denies.   Respiratory: Cough denies. SOB denies. Wheezing denies .   Cardiovascular: Chest pain denies. Dizziness denies. Palpitations +. Swelling in hands/feet denies.    Gastrointestinal: Abdominal pain denies. Constipation denies. Diarrhea denies. Heartburn denies. Nausea denies. Vomiting denies.    Genitourinary: Painful urination denies.   Gynecologic: Vaginal bleeding denies. Vaginal discharge denies. Leaking amniotic fluid denies. Contractions denies.    Psychiatric: Depressed mood denies. Suicidal thoughts denies.       Current Outpatient Medications:     hydrOXYzine pamoate (VISTARIL) 25 MG Cap, Take 1 capsule (25 mg total) by mouth every 6 (six) hours as needed (anxiety)., Disp: 30 capsule, Rfl: 0    PRENATAL 28 mg iron- 800 mcg Tab, Take 1 tablet by mouth., Disp: , Rfl:       Physical Exam:  /72   Pulse 102   Wt 115.7 kg (255 lb)   LMP 2023   BMI 41.16 kg/m²     Constitutional: General appearance: healthy, well-nourished and well-developed   Psychiatric:  Orientation to time, place and person. Normal mood and affect and active, alert   CV/Resp:CTA B no MMG no WWR  Abdomen: Auscultation/Inspection/Palpation: Gravid. No tenderness or masses. Soft, nondistended   Extremities: no CCE        FH: 30cm  FHT: 140      Assessment/Plan  1. Other obesity affecting pregnancy, antepartum  Patient educated on risks and complications of obesity and pregnancy including but not limited to first trimester miscarriage recurrent miscarriages, congenital anomalies, HTN disorders, gestational diabetes, macrosomia, PTL&D, higher risk of fetal demise in-utero and higher risk of CS (and wound complication including infection breakdown) with obesity.     The [place] preventative task force placed the following recommendations in 2016 which ACOG supports regarding utilization of low dose 81mg ASA starting at 14 weeks and continuing through pregnancy in high risk pregnant women. The utilization of this has been shown to reduce the risk for preeclampsia by 24% in clinical trials and reduce the risk of  birth by 14% and IUGR about 20%.      Cont ASA 81mg     2. Encounter for ultrasound to assess fetal growth  Anatomy US 23: Fetal measurements: Biparietal diameter is 4.8 cm/20 weeks 4 days/90th percentile, head circumference is 19.0 cm/21 weeks 2 days/98th percentile, abdominal circumference is 15.6 cm/20 weeks 5 days/85th percentile, femur length is 3.2 cm/19 weeks 6 days/58th percentile and the estimated fetal weight is 354 g/20 weeks 3 days/93rd percentile (based on estimated gestational age).      Growth Scan: 24  EFW: 195g  EUA: 28w4d  66th percentile     Biophysical Profile: 24  2/2 - Breathing  2/2 - Tone  2/2 - Movement  2/2 - Fluid    Score: 8/8     3. Anxiety in pregnancy, Heart palpitations  Educated     Counseling information given for [facility] [phone]     HI & SI precautions  EKG, CBC, TSH, CMP  Rx: Vistaril 25 mg PRN  ER consult    4. Common cold  Educated  F/u with urgent care or PCP    5. Rh negative state in antepartum period  S/p rhogam 24    6. 30 weeks gestation of pregnancy  Discussed that should any of the following symptoms occur during pregnancy, patient should  contact the office immediately or go to the ER after business hours: spotting or bleeding, cramping, painful urination, severe vomiting, pain in one leg or calf, sudden gush of fluid, decrease or absence of fetal movement, sudden weight gain, periorbital or facial swelling, headache with visual changes and/or photophobia.       Discussed with patient travel precautions.    RTC 2 weeks

## 2024-02-19 ENCOUNTER — PATIENT MESSAGE (OUTPATIENT)
Dept: OTHER | Facility: OTHER | Age: 29
End: 2024-02-19
Payer: COMMERCIAL

## 2024-02-20 ENCOUNTER — LAB VISIT (OUTPATIENT)
Dept: LAB | Facility: HOSPITAL | Age: 29
End: 2024-02-20
Attending: OBSTETRICS & GYNECOLOGY
Payer: COMMERCIAL

## 2024-02-20 ENCOUNTER — TELEPHONE (OUTPATIENT)
Dept: OBSTETRICS AND GYNECOLOGY | Facility: CLINIC | Age: 29
End: 2024-02-20

## 2024-02-20 ENCOUNTER — ROUTINE PRENATAL (OUTPATIENT)
Dept: OBSTETRICS AND GYNECOLOGY | Facility: CLINIC | Age: 29
End: 2024-02-20
Payer: COMMERCIAL

## 2024-02-20 ENCOUNTER — CLINICAL SUPPORT (OUTPATIENT)
Dept: RESPIRATORY THERAPY | Facility: HOSPITAL | Age: 29
End: 2024-02-20
Attending: OBSTETRICS & GYNECOLOGY
Payer: COMMERCIAL

## 2024-02-20 VITALS
SYSTOLIC BLOOD PRESSURE: 132 MMHG | WEIGHT: 255 LBS | HEART RATE: 102 BPM | DIASTOLIC BLOOD PRESSURE: 72 MMHG | BODY MASS INDEX: 41.16 KG/M2

## 2024-02-20 DIAGNOSIS — Z67.91 RH NEGATIVE STATE IN ANTEPARTUM PERIOD: ICD-10-CM

## 2024-02-20 DIAGNOSIS — R00.2 HEART PALPITATIONS: ICD-10-CM

## 2024-02-20 DIAGNOSIS — F41.9 ANXIETY: ICD-10-CM

## 2024-02-20 DIAGNOSIS — O99.013 ANEMIA DURING PREGNANCY IN THIRD TRIMESTER: ICD-10-CM

## 2024-02-20 DIAGNOSIS — D64.9 ANEMIA, UNSPECIFIED TYPE: Primary | ICD-10-CM

## 2024-02-20 DIAGNOSIS — Z3A.30 30 WEEKS GESTATION OF PREGNANCY: ICD-10-CM

## 2024-02-20 DIAGNOSIS — Z36.89 ENCOUNTER FOR ULTRASOUND TO ASSESS FETAL GROWTH: ICD-10-CM

## 2024-02-20 DIAGNOSIS — F41.9 ANXIETY IN PREGNANCY, ANTEPARTUM: ICD-10-CM

## 2024-02-20 DIAGNOSIS — O26.899 RH NEGATIVE STATE IN ANTEPARTUM PERIOD: ICD-10-CM

## 2024-02-20 DIAGNOSIS — O99.340 ANXIETY IN PREGNANCY, ANTEPARTUM: ICD-10-CM

## 2024-02-20 DIAGNOSIS — E66.8 OTHER OBESITY AFFECTING PREGNANCY, ANTEPARTUM: Primary | ICD-10-CM

## 2024-02-20 DIAGNOSIS — O99.210 OTHER OBESITY AFFECTING PREGNANCY, ANTEPARTUM: Primary | ICD-10-CM

## 2024-02-20 LAB
ALBUMIN SERPL-MCNC: 3.3 G/DL (ref 3.4–5)
ALBUMIN/GLOB SERPL: 1.1 RATIO
ALP SERPL-CCNC: 102 UNIT/L (ref 50–144)
ALT SERPL-CCNC: 18 UNIT/L (ref 1–45)
ANION GAP SERPL CALC-SCNC: 3 MEQ/L (ref 2–13)
AST SERPL-CCNC: 24 UNIT/L (ref 14–36)
BILIRUB SERPL-MCNC: 0.3 MG/DL (ref 0–1)
BILIRUB UR QL STRIP: NORMAL
BUN SERPL-MCNC: 10 MG/DL (ref 7–20)
CALCIUM SERPL-MCNC: 9 MG/DL (ref 8.4–10.2)
CHLORIDE SERPL-SCNC: 108 MMOL/L (ref 98–110)
CO2 SERPL-SCNC: 26 MMOL/L (ref 21–32)
CREAT SERPL-MCNC: 0.56 MG/DL (ref 0.66–1.25)
CREAT/UREA NIT SERPL: 18 (ref 12–20)
ERYTHROCYTE [DISTWIDTH] IN BLOOD BY AUTOMATED COUNT: 14.3 % (ref 11–14.5)
FERRITIN SERPL-MCNC: 10.6 NG/ML (ref 6.24–264)
GFR SERPLBLD CREATININE-BSD FMLA CKD-EPI: >90 MLS/MIN/1.73/M2
GLOBULIN SER-MCNC: 3 GM/DL (ref 2–3.9)
GLUCOSE SERPL-MCNC: 87 MG/DL (ref 70–115)
GLUCOSE UR QL STRIP: NEGATIVE
HCT VFR BLD AUTO: 34.7 % (ref 36–48)
HGB BLD-MCNC: 11.1 G/DL (ref 11.8–16)
IRON SATN MFR SERPL: 18 % (ref 20–50)
IRON SERPL-MCNC: 73 UG/DL (ref 50–170)
KETONES UR QL STRIP: NORMAL
LEUKOCYTE ESTERASE UR QL STRIP: NEGATIVE
MCH RBC QN AUTO: 27.3 PG (ref 27–34)
MCHC RBC AUTO-ENTMCNC: 32 G/DL (ref 31–37)
MCV RBC AUTO: 85.3 FL (ref 79–99)
NRBC BLD AUTO-RTO: 0 %
OHS QRS DURATION: 84 MS
OHS QTC CALCULATION: 399 MS
PH, POC UA: NORMAL
PLATELET # BLD AUTO: 226 X10(3)/MCL (ref 140–371)
PMV BLD AUTO: 9.2 FL (ref 9.4–12.4)
POC BLOOD, URINE: NORMAL
POC NITRATES, URINE: NEGATIVE
POTASSIUM SERPL-SCNC: 3.9 MMOL/L (ref 3.5–5.1)
PROT SERPL-MCNC: 6.3 GM/DL (ref 6.3–8.2)
PROT UR QL STRIP: NEGATIVE
RBC # BLD AUTO: 4.07 X10(6)/MCL (ref 4–5.1)
SODIUM SERPL-SCNC: 137 MMOL/L (ref 135–145)
SP GR UR STRIP: NORMAL (ref 1–1.03)
TIBC SERPL-MCNC: 329 UG/DL (ref 70–310)
TIBC SERPL-MCNC: 402 UG/DL (ref 250–450)
TRANSFERRIN SERPL-MCNC: 378 MG/DL (ref 180–382)
TSH SERPL-ACNC: 1.89 UIU/ML (ref 0.36–3.74)
UROBILINOGEN UR STRIP-ACNC: NORMAL (ref 0.3–2.2)
WBC # SPEC AUTO: 10.77 X10(3)/MCL (ref 4–11.5)

## 2024-02-20 PROCEDURE — 80053 COMPREHEN METABOLIC PANEL: CPT

## 2024-02-20 PROCEDURE — 83540 ASSAY OF IRON: CPT

## 2024-02-20 PROCEDURE — 85027 COMPLETE CBC AUTOMATED: CPT

## 2024-02-20 PROCEDURE — 84443 ASSAY THYROID STIM HORMONE: CPT

## 2024-02-20 PROCEDURE — 36415 COLL VENOUS BLD VENIPUNCTURE: CPT

## 2024-02-20 PROCEDURE — 0502F SUBSEQUENT PRENATAL CARE: CPT | Mod: CPTII,,, | Performed by: OBSTETRICS & GYNECOLOGY

## 2024-02-20 PROCEDURE — 82728 ASSAY OF FERRITIN: CPT

## 2024-02-20 PROCEDURE — 93010 ELECTROCARDIOGRAM REPORT: CPT | Mod: ,,, | Performed by: INTERNAL MEDICINE

## 2024-02-20 PROCEDURE — 93005 ELECTROCARDIOGRAM TRACING: CPT

## 2024-02-20 RX ORDER — IRON,CARBONYL/ASCORBIC ACID 100-250 MG
1 TABLET ORAL DAILY
Qty: 30 TABLET | Refills: 3 | Status: SHIPPED | OUTPATIENT
Start: 2024-02-20 | End: 2024-06-06

## 2024-02-20 RX ORDER — HYDROXYZINE PAMOATE 25 MG/1
25 CAPSULE ORAL EVERY 6 HOURS PRN
Qty: 30 CAPSULE | Refills: 0 | Status: SHIPPED | OUTPATIENT
Start: 2024-02-20 | End: 2024-04-16

## 2024-02-20 NOTE — TELEPHONE ENCOUNTER
----- Message from Lindy Stratton MD sent at 2/20/2024  5:14 PM CST -----  ICAR daily  ----- Message -----  From: Lab, Background User  Sent: 2/20/2024  10:51 AM CST  To: Lindy Stratton MD

## 2024-02-20 NOTE — TELEPHONE ENCOUNTER
Contacted pt, informed her she is anemic and ill send her out icar to take daily. Pt verbalized understanding.

## 2024-03-04 ENCOUNTER — PATIENT MESSAGE (OUTPATIENT)
Dept: OTHER | Facility: OTHER | Age: 29
End: 2024-03-04
Payer: COMMERCIAL

## 2024-03-04 NOTE — PROGRESS NOTES
Chief Complaint:  Routine Prenatal Visit    History of Present Illness:  Bri Eason is a 29 y.o. year old  at 32w1d presents for her ob exam. She is doing well. +fetal movement. Denies vaginal bleeding, vaginal discharge, LOF or contractions. Negative preeclampsia ROS.      Review of Systems:  General/Constitutional: Fever denies. Headache denies.    Skin: Rash denies.   Respiratory: Cough denies. SOB denies. Wheezing denies .   Cardiovascular: Chest pain denies. Dizziness denies. Palpitations denies. Swelling in hands/feet denies.    Gastrointestinal: Abdominal pain denies. Constipation denies. Diarrhea denies. Heartburn denies. Nausea denies. Vomiting denies.    Genitourinary: Painful urination denies.   Gynecologic: Vaginal bleeding denies. Vaginal discharge denies. Leaking amniotic fluid denies. Contractions denies.    Psychiatric: Depressed mood denies. Suicidal thoughts denies.       Current Outpatient Medications:     hydrOXYzine pamoate (VISTARIL) 25 MG Cap, Take 1 capsule (25 mg total) by mouth every 6 (six) hours as needed (anxiety)., Disp: 30 capsule, Rfl: 0    iron-vitamin C 100-250 mg, ICAR-C, (ICAR-C) 100-250 mg Tab, Take 1 tablet by mouth once daily., Disp: 30 tablet, Rfl: 3    PRENATAL 28 mg iron- 800 mcg Tab, Take 1 tablet by mouth., Disp: , Rfl:       Physical Exam:  /80   Wt 114.7 kg (252 lb 12.8 oz)   LMP 2023   BMI 40.80 kg/m²     Constitutional: General appearance: healthy, well-nourished and well-developed   Psychiatric:  Orientation to time, place and person. Normal mood and affect and active, alert   Abdomen: Auscultation/Inspection/Palpation: Gravid. No tenderness or masses. Soft, nondistended   Extremities: no CCE       FH: 32cm  FHT: 140      Assessment/Plan  1. Other obesity affecting pregnancy, antepartum  Patient educated on risks and complications of obesity and pregnancy including but not limited to first trimester miscarriage recurrent miscarriages,  congenital anomalies, HTN disorders, gestational diabetes, macrosomia, PTL&D, higher risk of fetal demise in-utero and higher risk of CS (and wound complication including infection breakdown) with obesity.     The [place] preventative task force placed the following recommendations in 2016 which ACOG supports regarding utilization of low dose 81mg ASA starting at 14 weeks and continuing through pregnancy in high risk pregnant women. The utilization of this has been shown to reduce the risk for preeclampsia by 24% in clinical trials and reduce the risk of  birth by 14% and IUGR about 20%.      Cont ASA 81 mg  Repeat growth in 1mo     2. Encounter for ultrasound to assess fetal growth  Educated    Growth Scan: 3/5/24  EFW: 1983g  EUA: 32w1d  50th percentile    Anatomy US 23: Fetal measurements: Biparietal diameter is 4.8 cm/20 weeks 4 days/90th percentile, head circumference is 19.0 cm/21 weeks 2 days/98th percentile, abdominal circumference is 15.6 cm/20 weeks 5 days/85th percentile, femur length is 3.2 cm/19 weeks 6 days/58th percentile and the estimated fetal weight is 354 g/20 weeks 3 days/93rd percentile (based on estimated gestational age).      3. Anemia in pregnancy  Anemia is defined by the WHO as hemoglobin less than 11 g/dL with the equivalent of lesser hematocrit of 33%. It is present in the about 30% of reproductive age females, and 40% of print individuals. The 2 most common causes of anemia in pregnancy are physiologic anemia of pregnancy and  iron-deficiency. Much less common causes of anemia include hemoglobinopathies (sickle cell, thalassemia), RBC membrane disorders (hereditary spherocytosis and had hereditary Elliptocytosis), and acquired anemias (folate deficiency, vitamin B12 deficiency, other vitamin deficiencies, autoimmune hemolytic anemia, hypothyroidism and chronic kidney disease). Anemia in pregnancy is associated with the twofold increase in  delivery and 3 fold increase in  delivering a low birth weight baby.     Educated    Ferrous sulfate 325 mg twice daily, vitamin-C 250 mg twice daily, and folic acid 1 mg daily    2/20/24  Iron: 73  TIBC:402  Ferritin: 10.6    Lab Results   Component Value Date    HGB 11.1 (L) 02/20/2024    HGB 12.0 02/06/2024    HCT 34.7 (L) 02/20/2024    HCT 36.9 02/06/2024     4. Anxiety in pregnancy, antepartum  Educated     Counseling information given for [facility] G[facility] [phone]     HI & SI precautions  Mood good w/ Vistaril 25 mg    EKG 2/20/24: Normal sinus rhythm. Normal ECG. No previous ECGs available.    2/20/24  H&H:11.1/34.7  TSH: 1.890  ALT/AST: 18/24    5. Rh negative state in antepartum period  S/p rhogam 2/6/24    6. 32 weeks gestation of pregnancy  Discussed that should any of the following symptoms occur during pregnancy, patient should contact the office immediately or go to the ER after business hours: spotting or bleeding, cramping, painful urination, severe vomiting, pain in one leg or calf, sudden gush of fluid, decrease or absence of fetal movement, sudden weight gain, periorbital or facial swelling, headache with visual changes and/or photophobia.       Discussed with patient travel precautions.    RTC 2 weeks for NST      This note was transcribed by Rebeca Trujillo MA. There may be transcription errors as a result, however minimal. Effort has been made to ensure accuracy of transcription, but any obvious errors or omissions should be clarified with the author of the document.

## 2024-03-05 ENCOUNTER — ROUTINE PRENATAL (OUTPATIENT)
Dept: OBSTETRICS AND GYNECOLOGY | Facility: CLINIC | Age: 29
End: 2024-03-05
Payer: COMMERCIAL

## 2024-03-05 VITALS — WEIGHT: 252.81 LBS | BODY MASS INDEX: 40.8 KG/M2 | DIASTOLIC BLOOD PRESSURE: 80 MMHG | SYSTOLIC BLOOD PRESSURE: 122 MMHG

## 2024-03-05 DIAGNOSIS — Z36.89 ENCOUNTER FOR ULTRASOUND TO ASSESS FETAL GROWTH: ICD-10-CM

## 2024-03-05 DIAGNOSIS — O26.899 RH NEGATIVE STATE IN ANTEPARTUM PERIOD: ICD-10-CM

## 2024-03-05 DIAGNOSIS — D64.9 ANEMIA, UNSPECIFIED TYPE: Primary | ICD-10-CM

## 2024-03-05 DIAGNOSIS — E66.8 OTHER OBESITY AFFECTING PREGNANCY, ANTEPARTUM: ICD-10-CM

## 2024-03-05 DIAGNOSIS — O99.210 OTHER OBESITY AFFECTING PREGNANCY, ANTEPARTUM: ICD-10-CM

## 2024-03-05 DIAGNOSIS — Z3A.32 32 WEEKS GESTATION OF PREGNANCY: ICD-10-CM

## 2024-03-05 DIAGNOSIS — O99.340 ANXIETY IN PREGNANCY, ANTEPARTUM: ICD-10-CM

## 2024-03-05 DIAGNOSIS — Z67.91 RH NEGATIVE STATE IN ANTEPARTUM PERIOD: ICD-10-CM

## 2024-03-05 DIAGNOSIS — F41.9 ANXIETY IN PREGNANCY, ANTEPARTUM: ICD-10-CM

## 2024-03-05 PROCEDURE — 0502F SUBSEQUENT PRENATAL CARE: CPT | Mod: CPTII,,, | Performed by: OBSTETRICS & GYNECOLOGY

## 2024-03-05 PROCEDURE — 76819 FETAL BIOPHYS PROFIL W/O NST: CPT | Mod: ,,, | Performed by: OBSTETRICS & GYNECOLOGY

## 2024-03-05 PROCEDURE — 76816 OB US FOLLOW-UP PER FETUS: CPT | Mod: ,,, | Performed by: OBSTETRICS & GYNECOLOGY

## 2024-03-18 NOTE — PROGRESS NOTES
Chief Complaint:  Routine Prenatal Visit    History of Present Illness:  Bri Eason is a 29 y.o. year old  at 34w1d presents for her ob exam and fetal testing. She is doing well. +fetal movement. Denies vaginal bleeding, vaginal discharge, LOF or contractions. Negative preeclampsia ROS.      Review of Systems:  General/Constitutional: Fever denies. Headache denies.    Skin: Rash denies.   Respiratory: Cough denies. SOB denies. Wheezing denies .   Cardiovascular: Chest pain denies. Dizziness denies. Palpitations denies. Swelling in hands/feet denies.    Gastrointestinal: Abdominal pain denies. Constipation denies. Diarrhea denies. Heartburn denies. Nausea denies. Vomiting denies.    Genitourinary: Painful urination denies.   Gynecologic: Vaginal bleeding denies. Vaginal discharge denies. Leaking amniotic fluid denies. Contractions denies.    Psychiatric: Depressed mood denies. Suicidal thoughts denies.       Current Outpatient Medications:     hydrOXYzine pamoate (VISTARIL) 25 MG Cap, Take 1 capsule (25 mg total) by mouth every 6 (six) hours as needed (anxiety)., Disp: 30 capsule, Rfl: 0    iron-vitamin C 100-250 mg, ICAR-C, (ICAR-C) 100-250 mg Tab, Take 1 tablet by mouth once daily., Disp: 30 tablet, Rfl: 3    PRENATAL 28 mg iron- 800 mcg Tab, Take 1 tablet by mouth., Disp: , Rfl:       Physical Exam:  LMP 2023     Constitutional: General appearance: healthy, well-nourished and well-developed   Psychiatric:  Orientation to time, place and person. Normal mood and affect and active, alert   Abdomen: Auscultation/Inspection/Palpation: Gravid. No tenderness or masses. Soft, nondistended   Extremities: no CCE      NST:   - Baseline: 120s  - Variability: moderate  - Accelerations: PRESENT: 15X15   - Decelerations: ABSENT  - Time on monitor: 20 minutes  - Category: .reactive, category 1    Mettler:   - CTX: ABSENT        Assessment/Plan  1. Other obesity affecting pregnancy, antepartum  Patient educated on  risks and complications of obesity and pregnancy including but not limited to first trimester miscarriage recurrent miscarriages, congenital anomalies, HTN disorders, gestational diabetes, macrosomia, PTL&D, higher risk of fetal demise in-utero and higher risk of CS (and wound complication including infection breakdown) with obesity.     The [place] preventative task force placed the following recommendations in 2016 which ACOG supports regarding utilization of low dose 81mg ASA starting at 14 weeks and continuing through pregnancy in high risk pregnant women. The utilization of this has been shown to reduce the risk for preeclampsia by 24% in clinical trials and reduce the risk of  birth by 14% and IUGR about 20%.      Cont ASA 81 mg     2. Encounter for ultrasound to assess fetal growth  Educated     Growth Scan: 3/5/24  EFW: 1983g  EUA: 32w1d  50th percentile     Anatomy US 23: Fetal measurements: Biparietal diameter is 4.8 cm/20 weeks 4 days/90th percentile, head circumference is 19.0 cm/21 weeks 2 days/98th percentile, abdominal circumference is 15.6 cm/20 weeks 5 days/85th percentile, femur length is 3.2 cm/19 weeks 6 days/58th percentile and the estimated fetal weight is 354 g/20 weeks 3 days/93rd percentile (based on estimated gestational age).       3. Anemia, antepartum  Anemia is defined by the WHO as hemoglobin less than 11 g/dL with the equivalent of lesser hematocrit of 33%. It is present in the about 30% of reproductive age females, and 40% of print individuals. The 2 most common causes of anemia in pregnancy are physiologic anemia of pregnancy and  iron-deficiency. Much less common causes of anemia include hemoglobinopathies (sickle cell, thalassemia), RBC membrane disorders (hereditary spherocytosis and had hereditary Elliptocytosis), and acquired anemias (folate deficiency, vitamin B12 deficiency, other vitamin deficiencies, autoimmune hemolytic anemia, hypothyroidism and chronic kidney  disease). Anemia in pregnancy is associated with the twofold increase in  delivery and 3 fold increase in delivering a low birth weight baby.     Educated    Ferrous sulfate 325 mg twice daily, vitamin-C 250 mg twice daily, and folic acid 1 mg daily     24  Iron: 73  TIBC:402  Ferritin: 10.6     Lab Results   Component Value Date    HGB 11.1 (L) 2024    HGB 12.0 2024    HCT 34.7 (L) 2024    HCT 36.9 2024     4. Anxiety in pregnancy, antepartum  Educated     Counseling information given for Sherie G[facility] [phone]     HI & SI precautions  Mood good w/ Vistaril 25 mg     5. Rh negative state in antepartum period  S/p rhogam 24     6. 34 weeks gestation of pregnancy  Discussed that should any of the following symptoms occur during pregnancy, patient should contact the office immediately or go to the ER after business hours: spotting or bleeding, cramping, painful urination, severe vomiting, pain in one leg or calf, sudden gush of fluid, decrease or absence of fetal movement, sudden weight gain, periorbital or facial swelling, headache with visual changes and/or photophobia.       Discussed with patient travel precautions.    RTC 1 week for NST    This note was transcribed by Rebeca Trujillo MA. There may be transcription errors as a result, however minimal. Effort has been made to ensure accuracy of transcription, but any obvious errors or omissions should be clarified with the author of the document.

## 2024-03-19 ENCOUNTER — CLINICAL SUPPORT (OUTPATIENT)
Dept: OBSTETRICS AND GYNECOLOGY | Facility: CLINIC | Age: 29
End: 2024-03-19
Payer: COMMERCIAL

## 2024-03-19 VITALS
HEIGHT: 66 IN | DIASTOLIC BLOOD PRESSURE: 70 MMHG | BODY MASS INDEX: 41.04 KG/M2 | SYSTOLIC BLOOD PRESSURE: 112 MMHG | WEIGHT: 255.38 LBS | TEMPERATURE: 97 F

## 2024-03-19 DIAGNOSIS — F41.9 ANXIETY IN PREGNANCY, ANTEPARTUM: ICD-10-CM

## 2024-03-19 DIAGNOSIS — O26.899 RH NEGATIVE STATE IN ANTEPARTUM PERIOD: ICD-10-CM

## 2024-03-19 DIAGNOSIS — Z67.91 RH NEGATIVE STATE IN ANTEPARTUM PERIOD: ICD-10-CM

## 2024-03-19 DIAGNOSIS — O99.210 OTHER OBESITY AFFECTING PREGNANCY, ANTEPARTUM: Primary | ICD-10-CM

## 2024-03-19 DIAGNOSIS — Z36.89 ENCOUNTER FOR ULTRASOUND TO ASSESS FETAL GROWTH: ICD-10-CM

## 2024-03-19 DIAGNOSIS — E66.8 OTHER OBESITY AFFECTING PREGNANCY, ANTEPARTUM: Primary | ICD-10-CM

## 2024-03-19 DIAGNOSIS — O99.340 ANXIETY IN PREGNANCY, ANTEPARTUM: ICD-10-CM

## 2024-03-19 DIAGNOSIS — Z3A.34 34 WEEKS GESTATION OF PREGNANCY: ICD-10-CM

## 2024-03-19 DIAGNOSIS — D64.9 ANEMIA, UNSPECIFIED TYPE: ICD-10-CM

## 2024-03-19 PROCEDURE — 0502F SUBSEQUENT PRENATAL CARE: CPT | Mod: CPTII,,, | Performed by: OBSTETRICS & GYNECOLOGY

## 2024-03-21 NOTE — PROGRESS NOTES
Chief Complaint:  Routine Prenatal Visit    History of Present Illness:  Bri Eason is a 29 y.o. year old  at 35w1d presents for preadmit ob. She is doing well. +fetal movement. Denies vaginal bleeding, vaginal discharge, LOF or contractions. Negative preeclampsia ROS.      Review of Systems:  General/Constitutional: Fever denies .    Skin: Rash denies.   Respiratory: Cough denies. SOB denies. Wheezing denies .   Cardiovascular: Chest pain denies. Dizziness denies. Palpitations denies. Swelling in hands/feet denies    Gastrointestinal: Abdominal pain denies. Constipation denies. Diarrhea denies. Heartburn denies. Nausea denies. Vomiting denies    Genitourinary: Painful urination denies.   Gynecologic: Vaginal bleeding denies. Vaginal discharge Normal. Leaking amniotic fluid denies. Contractions denies    Psychiatric: Depressed mood denies. Suicidal thoughts denies.     OB History    Para Term  AB Living   1 0 0 0 0 0   SAB IAB Ectopic Multiple Live Births   0 0 0 0 0      # 1 - Date: None, Sex: None, Weight: None, GA: None, Delivery: None, Apgar1: None, Apgar5: None, Living: None, Birth Comments: None      Past Medical History:   Diagnosis Date    Anemia 2024    Anxiety     Depression        Current Outpatient Medications:     hydrOXYzine pamoate (VISTARIL) 25 MG Cap, Take 1 capsule (25 mg total) by mouth every 6 (six) hours as needed (anxiety)., Disp: 30 capsule, Rfl: 0    iron-vitamin C 100-250 mg, ICAR-C, (ICAR-C) 100-250 mg Tab, Take 1 tablet by mouth once daily., Disp: 30 tablet, Rfl: 3    PRENATAL 28 mg iron- 800 mcg Tab, Take 1 tablet by mouth., Disp: , Rfl:     Review of patient's allergies indicates:  No Known Allergies    Past Surgical History:   Procedure Laterality Date    TONSILLECTOMY      WISDOM TOOTH EXTRACTION  2018     Family History   Problem Relation Age of Onset    Breast cancer Mother 50        + BRCA, pt tested neg for BRCA    Seizures Sister     Cancer Maternal  "Grandfather     Stroke Paternal Grandfather     Ovarian cancer Neg Hx     Uterine cancer Neg Hx     Cervical cancer Neg Hx     Colon cancer Neg Hx      Social History     Socioeconomic History    Marital status:    Tobacco Use    Smoking status: Never     Passive exposure: Never    Smokeless tobacco: Never   Substance and Sexual Activity    Alcohol use: Not Currently    Drug use: Never    Sexual activity: Yes     Partners: Male     Birth control/protection: None       Physical Exam:  /72   Temp 96.1 °F (35.6 °C) (Temporal)   Ht 5' 6" (1.676 m)   Wt 116.4 kg (256 lb 9.6 oz)   LMP 07/24/2023   BMI 41.42 kg/m²     Constitutional: General appearance: healthy, well-nourished and well-developed   Psychiatric:  Orientation to time, place and person. Normal mood and affect and active, alert  Cardiovascular: RRR, no murmurs, gallops or rub  Respiratory: clear to auscultate bilaterally, no wheezes, rales, or rhonchi  Abdomen: Auscultation/Inspection/Palpation: No tenderness or masses. Soft, nondistended   Extremities: no CCE      NST:   - Baseline: 140s  - Variability: moderate  - Accelerations: PRESENT: 15X15   - Decelerations: ABSENT  - Time on monitor: 20 minutes  - Category: .reactive, category 1    [place]:   - CTX: ABSENT        ABO/Rh:   Lab Results   Component Value Date/Time    ABORH A NEG 02/06/2024 08:02 AM    ABSCREEN NEG 02/06/2024 08:02 AM     H&H:  Lab Results   Component Value Date/Time    HGB 11.1 (L) 02/20/2024 10:24 AM    HCT 34.7 (L) 02/20/2024 10:24 AM     Platelet:  Lab Results   Component Value Date/Time     02/20/2024 10:24 AM     [place]:   Lab Results   Component Value Date/Time    VSVV4NC 107 01/23/2024 11:16 AM     HIV:   Lab Results   Component Value Date/Time    HIV Nonreactive 09/29/2023 02:25 PM     RPR:  Lab Results   Component Value Date/Time    SYPHAB Nonreactive 02/06/2024 08:02 AM     Hepatitis B Surface Antigen:  Lab Results   Component Value Date/Time    HEPBSURFAG " Negative 2023 02:25 PM     Hepatitis C:  Lab Results   Component Value Date/Time    HEPCAB Nonreactive 2023 02:25 PM     Rubella Immune Status:  Lab Results   Component Value Date/Time    RUBELLAIGG 19.00 2023 02:25 PM     Chlamydia:  Lab Results   Component Value Date/Time    CTRNA Negative 2023 12:04 PM     Gonorrhea:  Lab Results   Component Value Date/Time    FACVSPECIMEN Negative 2023 12:04 PM      Trichomoniasis:  Lab Results   Component Value Date/Time    TRVGRNA Negative 2023 12:04 PM      Last PAP Date: No result found      Assessment/Plan:  1. Other obesity affecting pregnancy, antepartum  Patient educated on risks and complications of obesity and pregnancy including but not limited to first trimester miscarriage recurrent miscarriages, congenital anomalies, HTN disorders, gestational diabetes, macrosomia, PTL&D, higher risk of fetal demise in-utero and higher risk of CS (and wound complication including infection breakdown) with obesity.     The [place] preventative task force placed the following recommendations in 2016 which ACOG supports regarding utilization of low dose 81mg ASA starting at 14 weeks and continuing through pregnancy in high risk pregnant women. The utilization of this has been shown to reduce the risk for preeclampsia by 24% in clinical trials and reduce the risk of  birth by 14% and IUGR about 20%.      Cont ASA 81 mg  NST: rx cat 1     2. Encounter for ultrasound to assess fetal growth  Educated     Growth Scan: 3/5/24  EFW: 1983g  EUA: 32w1d  50th percentile     Anatomy US 23: Fetal measurements: Biparietal diameter is 4.8 cm/20 weeks 4 days/90th percentile, head circumference is 19.0 cm/21 weeks 2 days/98th percentile, abdominal circumference is 15.6 cm/20 weeks 5 days/85th percentile, femur length is 3.2 cm/19 weeks 6 days/58th percentile and the estimated fetal weight is 354 g/20 weeks 3 days/93rd percentile (based on estimated  gestational age).       3. Anemia in pregnancy  Anemia is defined by the WHO as hemoglobin less than 11 g/dL with the equivalent of lesser hematocrit of 33%. It is present in the about 30% of reproductive age females, and 40% of print individuals. The 2 most common causes of anemia in pregnancy are physiologic anemia of pregnancy and  iron-deficiency. Much less common causes of anemia include hemoglobinopathies (sickle cell, thalassemia), RBC membrane disorders (hereditary spherocytosis and had hereditary Elliptocytosis), and acquired anemias (folate deficiency, vitamin B12 deficiency, other vitamin deficiencies, autoimmune hemolytic anemia, hypothyroidism and chronic kidney disease). Anemia in pregnancy is associated with the twofold increase in  delivery and 3 fold increase in delivering a low birth weight baby.     Educated    Ferrous sulfate 325 mg twice daily, vitamin-C 250 mg twice daily, and folic acid 1 mg daily     24  Iron: 73  TIBC:402  Ferritin: 10.6     Lab Results   Component Value Date    HGB 11.1 (L) 2024    HGB 12.0 2024    HCT 34.7 (L) 2024    HCT 36.9 2024     4. Anxiety in pregnancy, antepartum  Educated     Counseling information given for [facility] [phone]     HI & SI precautions  Mood good w/ Vistaril 25 mg     5. Rh negative state in antepartum period - s/p 24  6. 35 weeks gestation of pregnancy  Discussed risks and complications of vaginal delivery and . Reviewed delivery process including induction, c/s and anesthesia. Explained preadmit process. Gave labor precautions and advised to go to OB unit if rupture of membranes occurs, pt. experiences bleeding, contractions get closer than five minutes apart, or pt. notices lack of or decrease in fetal movement. Pt. voices understanding and gives informed consent.       Discussed that should any of the following symptoms occur during pregnancy, patient should contact the office  immediately or go to the ER after business hours: spotting or bleeding, cramping, painful urination, severe vomiting, pain in one leg or calf, sudden gush of fluid, decrease or absence of fetal movement, sudden weight gain, periorbital or facial swelling, headache with visual changes and/or photophobia.     Discussed with patient travel precautions.       GBS and gc/cz/tv/ collected  RTC 1 week on [facility] for NST, then 2 weeks here      This note was transcribed by Rebeca Trujillo MA. There may be transcription errors as a result, however minimal. Effort has been made to ensure accuracy of transcription, but any obvious errors or omissions should be clarified with the author of the document.

## 2024-03-25 ENCOUNTER — PATIENT MESSAGE (OUTPATIENT)
Dept: OTHER | Facility: OTHER | Age: 29
End: 2024-03-25
Payer: COMMERCIAL

## 2024-03-26 ENCOUNTER — CLINICAL SUPPORT (OUTPATIENT)
Dept: OBSTETRICS AND GYNECOLOGY | Facility: CLINIC | Age: 29
End: 2024-03-26
Payer: COMMERCIAL

## 2024-03-26 VITALS
WEIGHT: 256.63 LBS | TEMPERATURE: 96 F | DIASTOLIC BLOOD PRESSURE: 72 MMHG | SYSTOLIC BLOOD PRESSURE: 120 MMHG | HEIGHT: 66 IN | BODY MASS INDEX: 41.25 KG/M2

## 2024-03-26 DIAGNOSIS — O99.019 ANEMIA OF MOTHER IN PREGNANCY, ANTEPARTUM: ICD-10-CM

## 2024-03-26 DIAGNOSIS — Z67.91 RH NEGATIVE STATE IN ANTEPARTUM PERIOD: ICD-10-CM

## 2024-03-26 DIAGNOSIS — F41.9 ANXIETY IN PREGNANCY, ANTEPARTUM: ICD-10-CM

## 2024-03-26 DIAGNOSIS — E66.8 OTHER OBESITY AFFECTING PREGNANCY, ANTEPARTUM: Primary | ICD-10-CM

## 2024-03-26 DIAGNOSIS — Z3A.35 35 WEEKS GESTATION OF PREGNANCY: ICD-10-CM

## 2024-03-26 DIAGNOSIS — O99.210 OTHER OBESITY AFFECTING PREGNANCY, ANTEPARTUM: Primary | ICD-10-CM

## 2024-03-26 DIAGNOSIS — Z36.89 ENCOUNTER FOR ULTRASOUND TO ASSESS FETAL GROWTH: ICD-10-CM

## 2024-03-26 DIAGNOSIS — O26.899 RH NEGATIVE STATE IN ANTEPARTUM PERIOD: ICD-10-CM

## 2024-03-26 DIAGNOSIS — O99.340 ANXIETY IN PREGNANCY, ANTEPARTUM: ICD-10-CM

## 2024-03-26 PROCEDURE — 87591 N.GONORRHOEAE DNA AMP PROB: CPT | Performed by: OBSTETRICS & GYNECOLOGY

## 2024-03-26 PROCEDURE — 0502F SUBSEQUENT PRENATAL CARE: CPT | Mod: CPTII,,, | Performed by: OBSTETRICS & GYNECOLOGY

## 2024-03-26 PROCEDURE — 87661 TRICHOMONAS VAGINALIS AMPLIF: CPT | Performed by: OBSTETRICS & GYNECOLOGY

## 2024-03-26 PROCEDURE — 87653 STREP B DNA AMP PROBE: CPT | Performed by: OBSTETRICS & GYNECOLOGY

## 2024-03-26 PROCEDURE — 59025 FETAL NON-STRESS TEST: CPT | Mod: ,,, | Performed by: OBSTETRICS & GYNECOLOGY

## 2024-03-27 LAB
C TRACH RRNA SPEC QL NAA+PROBE: NEGATIVE
N GONORRHOEA RRNA SPEC QL NAA+PROBE: NEGATIVE
SPECIMEN SOURCE: NORMAL
STREP B PCR (OHS): NOT DETECTED
T VAGINALIS RRNA SPEC QL NAA+PROBE: NEGATIVE

## 2024-04-02 ENCOUNTER — HOSPITAL ENCOUNTER (OUTPATIENT)
Dept: PREADMISSION TESTING | Facility: HOSPITAL | Age: 29
Discharge: HOME | End: 2024-04-02
Payer: COMMERCIAL

## 2024-04-02 ENCOUNTER — HOSPITAL ENCOUNTER (OUTPATIENT)
Facility: HOSPITAL | Age: 29
Discharge: HOME | End: 2024-04-02
Attending: OBSTETRICS & GYNECOLOGY | Admitting: OBSTETRICS & GYNECOLOGY
Payer: COMMERCIAL

## 2024-04-02 VITALS — TEMPERATURE: 98 F | SYSTOLIC BLOOD PRESSURE: 128 MMHG | DIASTOLIC BLOOD PRESSURE: 63 MMHG | HEART RATE: 92 BPM

## 2024-04-02 DIAGNOSIS — Z3A.36 36 WEEKS GESTATION OF PREGNANCY: Primary | ICD-10-CM

## 2024-04-02 LAB
BASOPHILS # BLD AUTO: 0.02 X10(3)/MCL (ref 0.01–0.08)
BASOPHILS NFR BLD AUTO: 0.2 % (ref 0.1–1.2)
EOSINOPHIL # BLD AUTO: 0.32 X10(3)/MCL (ref 0.04–0.36)
EOSINOPHIL NFR BLD AUTO: 3.1 % (ref 0.7–7)
ERYTHROCYTE [DISTWIDTH] IN BLOOD BY AUTOMATED COUNT: 15.5 % (ref 11–14.5)
HBV SURFACE AG SERPL QL IA: NEGATIVE
HBV SURFACE AG SERPL QL IA: NORMAL
HCT VFR BLD AUTO: 35.8 % (ref 36–48)
HGB BLD-MCNC: 11.4 G/DL (ref 11.8–16)
HIV 1+2 AB+HIV1 P24 AG SERPL QL IA: NONREACTIVE
IMM GRANULOCYTES # BLD AUTO: 0.06 X10(3)/MCL (ref 0–0.03)
IMM GRANULOCYTES NFR BLD AUTO: 0.6 % (ref 0–0.5)
LYMPHOCYTES # BLD AUTO: 1.29 X10(3)/MCL (ref 1.16–3.74)
LYMPHOCYTES NFR BLD AUTO: 12.6 % (ref 20–55)
MCH RBC QN AUTO: 27.1 PG (ref 27–34)
MCHC RBC AUTO-ENTMCNC: 31.8 G/DL (ref 31–37)
MCV RBC AUTO: 85.2 FL (ref 79–99)
MONOCYTES # BLD AUTO: 0.67 X10(3)/MCL (ref 0.24–0.36)
MONOCYTES NFR BLD AUTO: 6.6 % (ref 4.7–12.5)
NEUTROPHILS # BLD AUTO: 7.86 X10(3)/MCL (ref 1.56–6.13)
NEUTROPHILS NFR BLD AUTO: 76.9 % (ref 37–73)
NRBC BLD AUTO-RTO: 0 %
PLATELET # BLD AUTO: 208 X10(3)/MCL (ref 140–371)
PMV BLD AUTO: 9.8 FL (ref 9.4–12.4)
RBC # BLD AUTO: 4.2 X10(6)/MCL (ref 4–5.1)
T PALLIDUM AB SER QL: NONREACTIVE
WBC # SPEC AUTO: 10.22 X10(3)/MCL (ref 4–11.5)

## 2024-04-02 PROCEDURE — 86780 TREPONEMA PALLIDUM: CPT | Performed by: OBSTETRICS & GYNECOLOGY

## 2024-04-02 PROCEDURE — 87340 HEPATITIS B SURFACE AG IA: CPT | Performed by: OBSTETRICS & GYNECOLOGY

## 2024-04-02 PROCEDURE — 85025 COMPLETE CBC W/AUTO DIFF WBC: CPT | Performed by: OBSTETRICS & GYNECOLOGY

## 2024-04-02 PROCEDURE — 99211 OFF/OP EST MAY X REQ PHY/QHP: CPT | Mod: 25

## 2024-04-02 PROCEDURE — 59025 FETAL NON-STRESS TEST: CPT

## 2024-04-02 PROCEDURE — 87389 HIV-1 AG W/HIV-1&-2 AB AG IA: CPT | Performed by: OBSTETRICS & GYNECOLOGY

## 2024-04-02 NOTE — PROGRESS NOTES
"Chief Complaint:  Routine Prenatal Visit    History of Present Illness:  Bri Eason is a 29 y.o. year old  at 37w1d presents for her ob exam and fetal testing. She is doing well. +fetal movement. Denies vaginal bleeding, vaginal discharge, LOF or contractions. Negative preeclampsia ROS.      Review of Systems:  General/Constitutional: Fever denies. Headache denies.    Skin: Rash denies.   Respiratory: Cough denies. SOB denies. Wheezing denies .   Cardiovascular: Chest pain denies. Dizziness denies. Palpitations denies. Swelling in hands/feet denies.    Gastrointestinal: Abdominal pain denies. Constipation denies. Diarrhea denies. Heartburn denies. Nausea denies. Vomiting denies.    Genitourinary: Painful urination denies.   Gynecologic: Vaginal bleeding denies. Vaginal discharge denies. Leaking amniotic fluid denies. Contractions denies.    Psychiatric: Depressed mood denies. Suicidal thoughts denies.       Current Outpatient Medications:     hydrOXYzine pamoate (VISTARIL) 25 MG Cap, Take 1 capsule (25 mg total) by mouth every 6 (six) hours as needed (anxiety)., Disp: 30 capsule, Rfl: 0    iron-vitamin C 100-250 mg, ICAR-C, (ICAR-C) 100-250 mg Tab, Take 1 tablet by mouth once daily., Disp: 30 tablet, Rfl: 3    PRENATAL 28 mg iron- 800 mcg Tab, Take 1 tablet by mouth., Disp: , Rfl:       Physical Exam:  /74 (BP Location: Right arm, Patient Position: Sitting)   Temp 98.4 °F (36.9 °C)   Ht 5' 6" (1.676 m)   Wt 119.5 kg (263 lb 6.4 oz)   LMP 2023   BMI 42.51 kg/m²     Constitutional: General appearance: healthy, well-nourished and well-developed   Psychiatric:  Orientation to time, place and person. Normal mood and affect and active, alert   Abdomen: Auscultation/Inspection/Palpation: Gravid. No tenderness or masses. Soft, nondistended   Extremities: no CCE    Cvx: .5/0/-3  NST:   - Baseline: 140  - Variability: moderate  - Accelerations: PRESENT: 15X15   - Decelerations: ABSENT  - Time on " monitor: 20 minutes  - Category: .reactive, category 1    [place]:   - CTX: ABSENT      Assessment/Plan  1. Other obesity affecting pregnancy, antepartum  Patient educated on risks and complications of obesity and pregnancy including but not limited to first trimester miscarriage recurrent miscarriages, congenital anomalies, HTN disorders, gestational diabetes, macrosomia, PTL&D, higher risk of fetal demise in-utero and higher risk of CS (and wound complication including infection breakdown) with obesity.     The [place] preventative task force placed the following recommendations in 2016 which ACOG supports regarding utilization of low dose 81mg ASA starting at 14 weeks and continuing through pregnancy in high risk pregnant women. The utilization of this has been shown to reduce the risk for preeclampsia by 24% in clinical trials and reduce the risk of  birth by 14% and IUGR about 20%.      Cont ASA 81 mg     Growth Scan: 24  EFW: 3272g  EUA: 38w1d  70th percentile    Biophysical Profile: 24  2/2 - Breathing  2/2 - Tone  2/2 - Movement  2/2 - Fluid    Score: 10/10  NST: rx cat 1    2. Encounter for ultrasound to assess fetal growth  Educated     Growth Scan: 3/5/24  EFW: 1983g, EUA: 32w1d, 50th percentile     Anatomy US 23: Fetal measurements: Biparietal diameter is 4.8 cm/20 weeks 4 days/90th percentile, head circumference is 19.0 cm/21 weeks 2 days/98th percentile, abdominal circumference is 15.6 cm/20 weeks 5 days/85th percentile, femur length is 3.2 cm/19 weeks 6 days/58th percentile and the estimated fetal weight is 354 g/20 weeks 3 days/93rd percentile (based on estimated gestational age).       3. Anemia of mother in pregnancy, antepartum  Anemia is defined by the WHO as hemoglobin less than 11 g/dL with the equivalent of lesser hematocrit of 33%. It is present in the about 30% of reproductive age females, and 40% of print individuals. The 2 most common causes of anemia in pregnancy are  physiologic anemia of pregnancy and  iron-deficiency. Much less common causes of anemia include hemoglobinopathies (sickle cell, thalassemia), RBC membrane disorders (hereditary spherocytosis and had hereditary Elliptocytosis), and acquired anemias (folate deficiency, vitamin B12 deficiency, other vitamin deficiencies, autoimmune hemolytic anemia, hypothyroidism and chronic kidney disease). Anemia in pregnancy is associated with the twofold increase in  delivery and 3 fold increase in delivering a low birth weight baby.     Educated    Ferrous sulfate 325 mg twice daily, vitamin-C 250 mg twice daily, and folic acid 1 mg daily     24  Iron: 73  TIBC:402  Ferritin: 10.6     Lab Results   Component Value Date    HGB 11.4 (L) 2024    HGB 11.1 (L) 2024    HCT 35.8 (L) 2024    HCT 34.7 (L) 2024     4. Anxiety in pregnancy, antepartum  Educated     Counseling information given for Sherie [facility] [phone]     HI & SI precautions  Mood good w/ Vistaril 25 mg     5. Rh negative state in antepartum period - s/p rhogam 24  6. 37 weeks gestation of pregnancy  Discussed that should any of the following symptoms occur during pregnancy, patient should contact the office immediately or go to the ER after business hours: spotting or bleeding, cramping, painful urination, severe vomiting, pain in one leg or calf, sudden gush of fluid, decrease or absence of fetal movement, sudden weight gain, periorbital or facial swelling, headache with visual changes and/or photophobia.       Discussed with patient travel precautions.    RTC 1 week for NST      This note was transcribed by Rebeca Trujillo MA. There may be transcription errors as a result, however minimal. Effort has been made to ensure accuracy of transcription, but any obvious errors or omissions should be clarified with the author of the document.

## 2024-04-02 NOTE — DISCHARGE INSTRUCTIONS
Things to Bring to the Hospital:    To help make your stay as comfortable as possible you should bring the following items when you come to the hospital to deliver your baby:    1.  TOOTHBRUSH  2.  TOOTHPASTE  3.  SHAMPOO/CONDITIONER   4.  DEODORANT  5.  HAIRBRUSH OR COMB  6.  UNDERGARMENTS  7.  SOAP OR BODY WASH  8.  SANITARY [place]/OVERNIGHTS IF YOU PREFER TO USE YOUR OWN PRODUCTS.   9.  NURSING PADS FOR YOUR BRA AND A BREAST PUMP IF YOU OWN ONE.     Other items needed for mom and baby include:    1.  Bras (2-3) should be packed and should be ones that were worn during pregnancy or nursing bras for breastfeeding.   2.  Gowns, slippers, and a robe.  3.  One outfit for baby to go home in as well as a blanket.  The hospital will provide baby clothes, blankets, diapers, and wipes during hospital stay.    4.  Federally approved car seat is required for the infant to go home at discharge.      OB VISITATION POLICY    The OB Department is open to family for patient visitation.  Smoking is not allowed on the premises.  Visitors must check with staff before entering a patient's room if there is a sign posted on the door or in the rossi.   Visitors may be in the room with baby if they are free from infection or any signs and symptoms of illness.  All visitors must use hand  or soap and water before touching the baby.   No children under age 12 are allowed to sleep overnight or to be left unattended in room.   Our rooms only accommodate one overnight guest.  While in labor and delivery room, you will be allowed 2 support persons if you choose and one additional guest will be allowed at the discretion of the physician or nurse.   The 2-3 labor and delivery support people may be interchangeable at the discretion of the OB nurse in charge of your care.   Any other visitors will be directed to the waiting room or post-partum room until after delivery.   No one will be allowed to stay in the labor and delivery  hallway.  After delivery, visitors will not be limited unless a procedure is in progress or your conditions warrants it.   Every day between 1:00 and 3:00 pm visitors are discouraged from entering the OB unit to encourage a mother/baby rest and bonding time.   Videos and photographs are not allowed to be taken during the delivery process. They may be taken after the baby is born and declared in good condition by nursing staff or physician.   If you will be having a delivery by , you will be allowed (1) support person in operating room unless the procedure is an emergency or under general anesthesia. No video or photography is allowed until after the procedure is complete.  The support person present will be asked to leave the operating room with the baby after delivery or if any problems arise.   Family and friends should be made familiar with the visitor policy for our facility, so that we may insure that the safety and well-being of you and your baby during this very important time.

## 2024-04-02 NOTE — NURSING
0915- Pt presents to  for scheduled NST. Pt denies contractions, pain, leaking of fluid or vaginal bleeding. Pt states active fetal movement. Pt denies eating breakfast this morning before NST. BP obtained. Plan of care discussed, pt verbalizes understanding.     0945- Apple juice given. Pt turned to left side.     1005- Dr. Hallman notified of reactive NST. Orders given for discharge.

## 2024-04-09 ENCOUNTER — CLINICAL SUPPORT (OUTPATIENT)
Dept: OBSTETRICS AND GYNECOLOGY | Facility: CLINIC | Age: 29
End: 2024-04-09
Payer: COMMERCIAL

## 2024-04-09 VITALS
DIASTOLIC BLOOD PRESSURE: 74 MMHG | TEMPERATURE: 98 F | HEIGHT: 66 IN | WEIGHT: 263.38 LBS | BODY MASS INDEX: 42.33 KG/M2 | SYSTOLIC BLOOD PRESSURE: 124 MMHG

## 2024-04-09 DIAGNOSIS — Z3A.37 37 WEEKS GESTATION OF PREGNANCY: ICD-10-CM

## 2024-04-09 DIAGNOSIS — O99.210 OTHER OBESITY AFFECTING PREGNANCY, ANTEPARTUM: Primary | ICD-10-CM

## 2024-04-09 DIAGNOSIS — O99.340 ANXIETY IN PREGNANCY, ANTEPARTUM: ICD-10-CM

## 2024-04-09 DIAGNOSIS — O99.019 ANEMIA OF MOTHER IN PREGNANCY, ANTEPARTUM: ICD-10-CM

## 2024-04-09 DIAGNOSIS — F41.9 ANXIETY IN PREGNANCY, ANTEPARTUM: ICD-10-CM

## 2024-04-09 DIAGNOSIS — E66.8 OTHER OBESITY AFFECTING PREGNANCY, ANTEPARTUM: Primary | ICD-10-CM

## 2024-04-09 DIAGNOSIS — Z36.89 ENCOUNTER FOR ULTRASOUND TO ASSESS FETAL GROWTH: ICD-10-CM

## 2024-04-09 DIAGNOSIS — Z67.91 RH NEGATIVE STATE IN ANTEPARTUM PERIOD: ICD-10-CM

## 2024-04-09 DIAGNOSIS — O26.899 RH NEGATIVE STATE IN ANTEPARTUM PERIOD: ICD-10-CM

## 2024-04-09 PROCEDURE — 76819 FETAL BIOPHYS PROFIL W/O NST: CPT | Mod: ,,, | Performed by: OBSTETRICS & GYNECOLOGY

## 2024-04-09 PROCEDURE — 76816 OB US FOLLOW-UP PER FETUS: CPT | Mod: ,,, | Performed by: OBSTETRICS & GYNECOLOGY

## 2024-04-09 PROCEDURE — 0502F SUBSEQUENT PRENATAL CARE: CPT | Mod: CPTII,,, | Performed by: OBSTETRICS & GYNECOLOGY

## 2024-04-12 NOTE — PROGRESS NOTES
Chief Complaint:  Routine Prenatal Visit    History of Present Illness:  Bri Eason is a 29 y.o. year old  at 38w0d presents for her ob exam. She is doing well. +fetal movement. Denies vaginal bleeding, vaginal discharge, LOF or contractions. Negative preeclampsia ROS.      Review of Systems:  General/Constitutional: Fever denies. Headache denies.    Skin: Rash denies.   Respiratory: Cough denies. SOB denies. Wheezing denies .   Cardiovascular: Chest pain denies. Dizziness denies. Palpitations denies. Swelling in hands/feet denies.    Gastrointestinal: Abdominal pain denies. Constipation denies. Diarrhea denies. Heartburn denies. Nausea denies. Vomiting denies.    Genitourinary: Painful urination denies.   Gynecologic: Vaginal bleeding denies. Vaginal discharge denies. Leaking amniotic fluid denies. Contractions denies.    Psychiatric: Depressed mood denies. Suicidal thoughts denies.       Current Outpatient Medications:     hydrOXYzine pamoate (VISTARIL) 25 MG Cap, Take 1 capsule (25 mg total) by mouth every 6 (six) hours as needed (anxiety)., Disp: 30 capsule, Rfl: 0    iron-vitamin C 100-250 mg, ICAR-C, (ICAR-C) 100-250 mg Tab, Take 1 tablet by mouth once daily., Disp: 30 tablet, Rfl: 3    PRENATAL 28 mg iron- 800 mcg Tab, Take 1 tablet by mouth., Disp: , Rfl:       Physical Exam:  /84   Temp 98.2 °F (36.8 °C)   Wt 121.2 kg (267 lb 3.2 oz)   LMP 2023   BMI 43.13 kg/m²     Constitutional: General appearance: healthy, well-nourished and well-developed   Psychiatric:  Orientation to time, place and person. Normal mood and affect and active, alert   Abdomen: Auscultation/Inspection/Palpation: Gravid. No tenderness or masses. Soft, nondistended   Extremities: no CCE    Cvx: 0/0/-3  NST:   - Baseline: 140s  - Variability: moderate  - Accelerations: PRESENT: 15X15   - Decelerations: ABSENT  - Time on monitor: 20 minutes  - Category: .reactive, category 1    [place]:   - CTX:  ABSENT        Assessment/Plan  1. Other obesity affecting pregnancy, antepartum  Patient educated on risks and complications of obesity and pregnancy including but not limited to first trimester miscarriage recurrent miscarriages, congenital anomalies, HTN disorders, gestational diabetes, macrosomia, PTL&D, higher risk of fetal demise in-utero and higher risk of CS (and wound complication including infection breakdown) with obesity.     The [place] preventative task force placed the following recommendations in 2016 which ACOG supports regarding utilization of low dose 81mg ASA starting at 14 weeks and continuing through pregnancy in high risk pregnant women. The utilization of this has been shown to reduce the risk for preeclampsia by 24% in clinical trials and reduce the risk of  birth by 14% and IUGR about 20%.      Cont ASA 81 mg    Biophysical Profile:  2/2 - Breathing  2/2 - Tone  2/2 - Movement  2/2 - Fluid    Score: 1010  NST: rx cat 1    2. Encounter for ultrasound to assess fetal growth  Educated     Growth Scan: 3/5/24  EFW: 1983g, EUA: 32w1d, 50th percentile     Anatomy US 23: Fetal measurements: Biparietal diameter is 4.8 cm/20 weeks 4 days/90th percentile, head circumference is 19.0 cm/21 weeks 2 days/98th percentile, abdominal circumference is 15.6 cm/20 weeks 5 days/85th percentile, femur length is 3.2 cm/19 weeks 6 days/58th percentile and the estimated fetal weight is 354 g/20 weeks 3 days/93rd percentile (based on estimated gestational age).       3. Anemia of mother in pregnancy, antepartum  Anemia is defined by the WHO as hemoglobin less than 11 g/dL with the equivalent of lesser hematocrit of 33%. It is present in the about 30% of reproductive age females, and 40% of print individuals. The 2 most common causes of anemia in pregnancy are physiologic anemia of pregnancy and  iron-deficiency. Much less common causes of anemia include hemoglobinopathies (sickle cell, thalassemia), RBC  membrane disorders (hereditary spherocytosis and had hereditary Elliptocytosis), and acquired anemias (folate deficiency, vitamin B12 deficiency, other vitamin deficiencies, autoimmune hemolytic anemia, hypothyroidism and chronic kidney disease). Anemia in pregnancy is associated with the twofold increase in  delivery and 3 fold increase in delivering a low birth weight baby.     Educated    Ferrous sulfate 325 mg twice daily, vitamin-C 250 mg twice daily, and folic acid 1 mg daily     24  Iron: 73  TIBC:402  Ferritin: 10.6    Lab Results   Component Value Date    HGB 11.4 (L) 2024    HGB 11.1 (L) 2024    HCT 35.8 (L) 2024    HCT 34.7 (L) 2024     4. Anxiety in pregnancy, antepartum  Educated     Counseling information given for Sherie Garrison [facility] [phone]     HI & SI precautions  Mood good w/ Vistaril 25 mg    5. GERD in pregnancy  HPV is a common viral infection that manifests in some patients as anogenital warts. HPV infection is acquired through direct genital contact.      Educated she may be at risk for other sexually transmitted diseases.        Individuals with anogenital warts can transmit HPV to sexual partners. Patients should inform current sexual partners of their diagnosis. Sexual partners may benefit from an evaluation for anogenital warts and other sexually transmitted diseases. Patients should avoid sexual contact with new partners until warts are successfully treated. Condom use may help to reduce acquisition of anogenital warts; however, HPV infection may still be transmitted through contact with infected skin that is not covered by a condom.       After initial appearance, anogenital warts may increase in number and size or regress spontaneously. It is estimated that approximately one-third of anogenital warts regress without treatment within four months     Rx: Protonix 40 mg QHS     6. Rh negative state in antepartum period - s/p rhogam 24  7. 38  weeks gestation of pregnancy  Discussed that should any of the following symptoms occur during pregnancy, patient should contact the office immediately or go to the ER after business hours: spotting or bleeding, cramping, painful urination, severe vomiting, pain in one leg or calf, sudden gush of fluid, decrease or absence of fetal movement, sudden weight gain, periorbital or facial swelling, headache with visual changes and/or photophobia.       Discussed with patient travel precautions.    RTC 1 week for NST      This note was transcribed by Rebeca Trujillo MA. There may be transcription errors as a result, however minimal. Effort has been made to ensure accuracy of transcription, but any obvious errors or omissions should be clarified with the author of the document.

## 2024-04-15 ENCOUNTER — CLINICAL SUPPORT (OUTPATIENT)
Dept: OBSTETRICS AND GYNECOLOGY | Facility: CLINIC | Age: 29
End: 2024-04-15
Payer: COMMERCIAL

## 2024-04-15 VITALS
WEIGHT: 267.19 LBS | DIASTOLIC BLOOD PRESSURE: 84 MMHG | BODY MASS INDEX: 43.13 KG/M2 | SYSTOLIC BLOOD PRESSURE: 132 MMHG | TEMPERATURE: 98 F

## 2024-04-15 DIAGNOSIS — O99.210 OTHER OBESITY AFFECTING PREGNANCY, ANTEPARTUM: Primary | ICD-10-CM

## 2024-04-15 DIAGNOSIS — O99.340 ANXIETY IN PREGNANCY, ANTEPARTUM: ICD-10-CM

## 2024-04-15 DIAGNOSIS — F41.9 ANXIETY IN PREGNANCY, ANTEPARTUM: ICD-10-CM

## 2024-04-15 DIAGNOSIS — Z36.89 ENCOUNTER FOR ULTRASOUND TO ASSESS FETAL GROWTH: ICD-10-CM

## 2024-04-15 DIAGNOSIS — E66.8 OTHER OBESITY AFFECTING PREGNANCY, ANTEPARTUM: Primary | ICD-10-CM

## 2024-04-15 DIAGNOSIS — Z3A.38 38 WEEKS GESTATION OF PREGNANCY: ICD-10-CM

## 2024-04-15 DIAGNOSIS — O99.619 GASTROESOPHAGEAL REFLUX IN PREGNANCY: ICD-10-CM

## 2024-04-15 DIAGNOSIS — Z67.91 RH NEGATIVE STATE IN ANTEPARTUM PERIOD: ICD-10-CM

## 2024-04-15 DIAGNOSIS — K21.9 GASTROESOPHAGEAL REFLUX IN PREGNANCY: ICD-10-CM

## 2024-04-15 DIAGNOSIS — O99.019 ANEMIA OF MOTHER IN PREGNANCY, ANTEPARTUM: ICD-10-CM

## 2024-04-15 DIAGNOSIS — O26.899 RH NEGATIVE STATE IN ANTEPARTUM PERIOD: ICD-10-CM

## 2024-04-15 PROCEDURE — 76818 FETAL BIOPHYS PROFILE W/NST: CPT | Mod: ,,, | Performed by: OBSTETRICS & GYNECOLOGY

## 2024-04-15 PROCEDURE — 0502F SUBSEQUENT PRENATAL CARE: CPT | Mod: CPTII,,, | Performed by: OBSTETRICS & GYNECOLOGY

## 2024-04-15 RX ORDER — PANTOPRAZOLE SODIUM 40 MG/1
40 TABLET, DELAYED RELEASE ORAL NIGHTLY
Qty: 30 TABLET | Refills: 1 | Status: SHIPPED | OUTPATIENT
Start: 2024-04-15 | End: 2024-06-06

## 2024-04-16 RX ORDER — HYDROXYZINE PAMOATE 25 MG/1
CAPSULE ORAL
Qty: 30 CAPSULE | Refills: 0 | Status: SHIPPED | OUTPATIENT
Start: 2024-04-16

## 2024-04-19 NOTE — PROGRESS NOTES
"Chief Complaint:  Routine Prenatal Visit    History of Present Illness:  Bri Eason is a 29 y.o. year old  at 39w0d presents for her ob exam. She is doing well. +fetal movement. Denies vaginal bleeding, vaginal discharge, LOF or contractions. Negative preeclampsia ROS.      Review of Systems:  General/Constitutional: Fever denies. Headache denies.    Skin: Rash denies.   Respiratory: Cough denies. SOB denies. Wheezing denies .   Cardiovascular: Chest pain denies. Dizziness denies. Palpitations denies. Swelling in hands/feet denies.    Gastrointestinal: Abdominal pain denies. Constipation denies. Diarrhea denies. Heartburn denies. Nausea denies. Vomiting denies.    Genitourinary: Painful urination denies.   Gynecologic: Vaginal bleeding denies. Vaginal discharge denies. Leaking amniotic fluid denies. Contractions denies.    Psychiatric: Depressed mood denies. Suicidal thoughts denies.       Current Outpatient Medications:     hydrOXYzine pamoate (VISTARIL) 25 MG Cap, TAKE 1 CAPSULE(25 MG) BY MOUTH EVERY 6 HOURS AS NEEDED FOR ANXIETY, Disp: 30 capsule, Rfl: 0    iron-vitamin C 100-250 mg, ICAR-C, (ICAR-C) 100-250 mg Tab, Take 1 tablet by mouth once daily., Disp: 30 tablet, Rfl: 3    pantoprazole (PROTONIX) 40 MG tablet, Take 1 tablet (40 mg total) by mouth nightly., Disp: 30 tablet, Rfl: 1    PRENATAL 28 mg iron- 800 mcg Tab, Take 1 tablet by mouth., Disp: , Rfl:       Physical Exam:  /76 (BP Location: Right arm, Patient Position: Sitting)   Ht 5' 6" (1.676 m)   Wt 122.7 kg (270 lb 9.6 oz)   LMP 2023   BMI 43.68 kg/m²     Constitutional: General appearance: healthy, well-nourished and well-developed   Psychiatric:  Orientation to time, place and person. Normal mood and affect and active, alert   Abdomen: Auscultation/Inspection/Palpation: Gravid. No tenderness or masses. Soft, nondistended   Extremities: no CCE    Cvx: 1/0/-3  NST:   - Baseline: 130s  - Variability: moderate  - Accelerations: " PRESENT: 15X15   - Decelerations: ABSENT  - Time on monitor: 20 minutes  - Category: .reactive, category 1    [place]:   - CTX: ABSENT        Assessment/Plan  1. Other obesity affecting pregnancy, antepartum  Patient educated on risks and complications of obesity and pregnancy including but not limited to first trimester miscarriage recurrent miscarriages, congenital anomalies, HTN disorders, gestational diabetes, macrosomia, PTL&D, higher risk of fetal demise in-utero and higher risk of CS (and wound complication including infection breakdown) with obesity.     The [place] preventative task force placed the following recommendations in 2016 which ACOG supports regarding utilization of low dose 81mg ASA starting at 14 weeks and continuing through pregnancy in high risk pregnant women. The utilization of this has been shown to reduce the risk for preeclampsia by 24% in clinical trials and reduce the risk of  birth by 14% and IUGR about 20%.      Cont ASA 81 mg  NST:  rx cat 1    Biophysical Profile:  2/2 - Breathing  2/2 - Tone  2/2 - Movement  2/2 - Fluid    Score: 8/8      Fetal Growth Overview   =================   Exam date        GA              BPD (mm)        HC (mm)         AC (mm)        FL (mm)        HL (mm)         EFW (g)   2024          28w 1d        73.7                 270.7             252.6              52.6                                   1302    55%   2024          32w 1d        83.4                 297                280.3             62.4                                   1983     32%   2024          37w 1d        95.5                 334.3             339.6              73.5                                   3272    61%     Anatomy US 23: Fetal measurements: Biparietal diameter is 4.8 cm/20 weeks 4 days/90th percentile, head circumference is 19.0 cm/21 weeks 2 days/98th percentile, abdominal circumference is 15.6 cm/20 weeks 5 days/85th percentile, femur length is 3.2 cm/19  weeks 6 days/58th percentile and the estimated fetal weight is 354 g/20 weeks 3 days/93rd percentile (based on estimated gestational age).       3. Anemia of mother in pregnancy, antepartum  Anemia is defined by the WHO as hemoglobin less than 11 g/dL with the equivalent of lesser hematocrit of 33%. It is present in the about 30% of reproductive age females, and 40% of print individuals. The 2 most common causes of anemia in pregnancy are physiologic anemia of pregnancy and  iron-deficiency. Much less common causes of anemia include hemoglobinopathies (sickle cell, thalassemia), RBC membrane disorders (hereditary spherocytosis and had hereditary Elliptocytosis), and acquired anemias (folate deficiency, vitamin B12 deficiency, other vitamin deficiencies, autoimmune hemolytic anemia, hypothyroidism and chronic kidney disease). Anemia in pregnancy is associated with the twofold increase in  delivery and 3 fold increase in delivering a low birth weight baby.     Educated    Ferrous sulfate 325 mg twice daily, vitamin-C 250 mg twice daily, and folic acid 1 mg daily     24  Iron: 73  TIBC:402  Ferritin: 10.6     Lab Results   Component Value Date    HGB 11.4 (L) 2024    HGB 11.1 (L) 2024    HCT 35.8 (L) 2024    HCT 34.7 (L) 2024     4. Anxiety in pregnancy, antepartum  Educated     Counseling information given for Sherie aGrr[facility] [phone]     HI & SI precautions  Mood good w/ Vistaril 25 mg     5. Gastroesophageal reflux in pregnancy  Resolved    6. Rh negative state in antepartum period - s/p rhogam 24  7. 39 weeks gestation of pregnancy  Discussed that should any of the following symptoms occur during pregnancy, patient should contact the office immediately or go to the ER after business hours: spotting or bleeding, cramping, painful urination, severe vomiting, pain in one leg or calf, sudden gush of fluid, decrease or absence of fetal movement, sudden weight gain,  periorbital or facial swelling, headache with visual changes and/or photophobia.       Discussed with patient travel precautions.    Desires 39wk induction, pt to [facility] for induction        This note was transcribed by Rebeca Trujillo MA. There may be transcription errors as a result, however minimal. Effort has been made to ensure accuracy of transcription, but any obvious errors or omissions should be clarified with the author of the document.

## 2024-04-19 NOTE — H&P (VIEW-ONLY)
"Chief Complaint:  Routine Prenatal Visit    History of Present Illness:  Bri Eason is a 29 y.o. year old  at 39w0d presents for her ob exam. She is doing well. +fetal movement. Denies vaginal bleeding, vaginal discharge, LOF or contractions. Negative preeclampsia ROS.      Review of Systems:  General/Constitutional: Fever denies. Headache denies.    Skin: Rash denies.   Respiratory: Cough denies. SOB denies. Wheezing denies .   Cardiovascular: Chest pain denies. Dizziness denies. Palpitations denies. Swelling in hands/feet denies.    Gastrointestinal: Abdominal pain denies. Constipation denies. Diarrhea denies. Heartburn denies. Nausea denies. Vomiting denies.    Genitourinary: Painful urination denies.   Gynecologic: Vaginal bleeding denies. Vaginal discharge denies. Leaking amniotic fluid denies. Contractions denies.    Psychiatric: Depressed mood denies. Suicidal thoughts denies.       Current Outpatient Medications:     hydrOXYzine pamoate (VISTARIL) 25 MG Cap, TAKE 1 CAPSULE(25 MG) BY MOUTH EVERY 6 HOURS AS NEEDED FOR ANXIETY, Disp: 30 capsule, Rfl: 0    iron-vitamin C 100-250 mg, ICAR-C, (ICAR-C) 100-250 mg Tab, Take 1 tablet by mouth once daily., Disp: 30 tablet, Rfl: 3    pantoprazole (PROTONIX) 40 MG tablet, Take 1 tablet (40 mg total) by mouth nightly., Disp: 30 tablet, Rfl: 1    PRENATAL 28 mg iron- 800 mcg Tab, Take 1 tablet by mouth., Disp: , Rfl:       Physical Exam:  /76 (BP Location: Right arm, Patient Position: Sitting)   Ht 5' 6" (1.676 m)   Wt 122.7 kg (270 lb 9.6 oz)   LMP 2023   BMI 43.68 kg/m²     Constitutional: General appearance: healthy, well-nourished and well-developed   Psychiatric:  Orientation to time, place and person. Normal mood and affect and active, alert   Abdomen: Auscultation/Inspection/Palpation: Gravid. No tenderness or masses. Soft, nondistended   Extremities: no CCE    Cvx: 1/0/-3  NST:   - Baseline: 130s  - Variability: moderate  - Accelerations: " PRESENT: 15X15   - Decelerations: ABSENT  - Time on monitor: 20 minutes  - Category: .reactive, category 1    [place]:   - CTX: ABSENT        Assessment/Plan  1. Other obesity affecting pregnancy, antepartum  Patient educated on risks and complications of obesity and pregnancy including but not limited to first trimester miscarriage recurrent miscarriages, congenital anomalies, HTN disorders, gestational diabetes, macrosomia, PTL&D, higher risk of fetal demise in-utero and higher risk of CS (and wound complication including infection breakdown) with obesity.     The [place] preventative task force placed the following recommendations in 2016 which ACOG supports regarding utilization of low dose 81mg ASA starting at 14 weeks and continuing through pregnancy in high risk pregnant women. The utilization of this has been shown to reduce the risk for preeclampsia by 24% in clinical trials and reduce the risk of  birth by 14% and IUGR about 20%.      Cont ASA 81 mg  NST:  rx cat 1    Biophysical Profile:  2/2 - Breathing  2/2 - Tone  2/2 - Movement  2/2 - Fluid    Score: 8/8      Fetal Growth Overview   =================   Exam date        GA              BPD (mm)        HC (mm)         AC (mm)        FL (mm)        HL (mm)         EFW (g)   2024          28w 1d        73.7                 270.7             252.6              52.6                                   1302    55%   2024          32w 1d        83.4                 297                280.3             62.4                                   1983     32%   2024          37w 1d        95.5                 334.3             339.6              73.5                                   3272    61%     Anatomy US 23: Fetal measurements: Biparietal diameter is 4.8 cm/20 weeks 4 days/90th percentile, head circumference is 19.0 cm/21 weeks 2 days/98th percentile, abdominal circumference is 15.6 cm/20 weeks 5 days/85th percentile, femur length is 3.2 cm/19  weeks 6 days/58th percentile and the estimated fetal weight is 354 g/20 weeks 3 days/93rd percentile (based on estimated gestational age).       3. Anemia of mother in pregnancy, antepartum  Anemia is defined by the WHO as hemoglobin less than 11 g/dL with the equivalent of lesser hematocrit of 33%. It is present in the about 30% of reproductive age females, and 40% of print individuals. The 2 most common causes of anemia in pregnancy are physiologic anemia of pregnancy and  iron-deficiency. Much less common causes of anemia include hemoglobinopathies (sickle cell, thalassemia), RBC membrane disorders (hereditary spherocytosis and had hereditary Elliptocytosis), and acquired anemias (folate deficiency, vitamin B12 deficiency, other vitamin deficiencies, autoimmune hemolytic anemia, hypothyroidism and chronic kidney disease). Anemia in pregnancy is associated with the twofold increase in  delivery and 3 fold increase in delivering a low birth weight baby.     Educated    Ferrous sulfate 325 mg twice daily, vitamin-C 250 mg twice daily, and folic acid 1 mg daily     24  Iron: 73  TIBC:402  Ferritin: 10.6     Lab Results   Component Value Date    HGB 11.4 (L) 2024    HGB 11.1 (L) 2024    HCT 35.8 (L) 2024    HCT 34.7 (L) 2024     4. Anxiety in pregnancy, antepartum  Educated     Counseling information given for Sherie Garrison [facility] [phone]     HI & SI precautions  Mood good w/ Vistaril 25 mg     5. Gastroesophageal reflux in pregnancy  Resolved    6. Rh negative state in antepartum period - s/p rhogam 24  7. 39 weeks gestation of pregnancy  Discussed that should any of the following symptoms occur during pregnancy, patient should contact the office immediately or go to the ER after business hours: spotting or bleeding, cramping, painful urination, severe vomiting, pain in one leg or calf, sudden gush of fluid, decrease or absence of fetal movement, sudden weight gain,  periorbital or facial swelling, headache with visual changes and/or photophobia.       Discussed with patient travel precautions.    Desires 39wk induction, pt to [facility] for induction        This note was transcribed by Rebeca Trujillo MA. There may be transcription errors as a result, however minimal. Effort has been made to ensure accuracy of transcription, but any obvious errors or omissions should be clarified with the author of the document.

## 2024-04-22 ENCOUNTER — HOSPITAL ENCOUNTER (INPATIENT)
Facility: HOSPITAL | Age: 29
LOS: 5 days | Discharge: HOME | End: 2024-04-27
Attending: OBSTETRICS & GYNECOLOGY | Admitting: OBSTETRICS & GYNECOLOGY
Payer: COMMERCIAL

## 2024-04-22 ENCOUNTER — CLINICAL SUPPORT (OUTPATIENT)
Dept: OBSTETRICS AND GYNECOLOGY | Facility: CLINIC | Age: 29
End: 2024-04-22
Payer: COMMERCIAL

## 2024-04-22 VITALS
WEIGHT: 270.63 LBS | DIASTOLIC BLOOD PRESSURE: 76 MMHG | SYSTOLIC BLOOD PRESSURE: 130 MMHG | BODY MASS INDEX: 43.49 KG/M2 | HEIGHT: 66 IN

## 2024-04-22 DIAGNOSIS — O99.619 GASTROESOPHAGEAL REFLUX IN PREGNANCY: ICD-10-CM

## 2024-04-22 DIAGNOSIS — Z3A.39 39 WEEKS GESTATION OF PREGNANCY: ICD-10-CM

## 2024-04-22 DIAGNOSIS — K21.9 GASTROESOPHAGEAL REFLUX IN PREGNANCY: ICD-10-CM

## 2024-04-22 DIAGNOSIS — Z67.91 RH NEGATIVE STATE IN ANTEPARTUM PERIOD: ICD-10-CM

## 2024-04-22 DIAGNOSIS — E66.8 OTHER OBESITY AFFECTING PREGNANCY, ANTEPARTUM: Primary | ICD-10-CM

## 2024-04-22 DIAGNOSIS — Z34.90 PREGNANCY: Primary | ICD-10-CM

## 2024-04-22 DIAGNOSIS — O99.210 OTHER OBESITY AFFECTING PREGNANCY, ANTEPARTUM: Primary | ICD-10-CM

## 2024-04-22 DIAGNOSIS — F41.9 ANXIETY IN PREGNANCY, ANTEPARTUM: ICD-10-CM

## 2024-04-22 DIAGNOSIS — O26.899 RH NEGATIVE STATE IN ANTEPARTUM PERIOD: ICD-10-CM

## 2024-04-22 DIAGNOSIS — Z36.89 ENCOUNTER FOR ULTRASOUND TO ASSESS FETAL GROWTH: ICD-10-CM

## 2024-04-22 DIAGNOSIS — O99.340 ANXIETY IN PREGNANCY, ANTEPARTUM: ICD-10-CM

## 2024-04-22 DIAGNOSIS — O99.019 ANEMIA OF MOTHER IN PREGNANCY, ANTEPARTUM: ICD-10-CM

## 2024-04-22 LAB
ABO AND RH: NORMAL
ALBUMIN SERPL-MCNC: 3.4 G/DL (ref 3.4–5)
ALBUMIN/GLOB SERPL: 1 RATIO
ALP SERPL-CCNC: 165 UNIT/L (ref 50–144)
ALT SERPL-CCNC: 22 UNIT/L (ref 1–45)
ANION GAP SERPL CALC-SCNC: 4 MEQ/L (ref 2–13)
ANTIBODY SCREEN: NORMAL
APPEARANCE UR: CLEAR
AST SERPL-CCNC: 29 UNIT/L (ref 14–36)
BASOPHILS # BLD AUTO: 0.02 X10(3)/MCL (ref 0.01–0.08)
BASOPHILS NFR BLD AUTO: 0.2 % (ref 0.1–1.2)
BILIRUB SERPL-MCNC: 0.3 MG/DL (ref 0–1)
BILIRUB UR QL STRIP.AUTO: NEGATIVE
BILIRUB UR QL STRIP: NORMAL
BUN SERPL-MCNC: 5 MG/DL (ref 7–20)
CALCIUM SERPL-MCNC: 9 MG/DL (ref 8.4–10.2)
CHLORIDE SERPL-SCNC: 108 MMOL/L (ref 98–110)
CO2 SERPL-SCNC: 22 MMOL/L (ref 21–32)
COLOR UR AUTO: YELLOW
CREAT SERPL-MCNC: 0.5 MG/DL (ref 0.66–1.25)
CREAT/UREA NIT SERPL: 10 (ref 12–20)
EOSINOPHIL # BLD AUTO: 0.16 X10(3)/MCL (ref 0.04–0.36)
EOSINOPHIL NFR BLD AUTO: 1.5 % (ref 0.7–7)
ERYTHROCYTE [DISTWIDTH] IN BLOOD BY AUTOMATED COUNT: 15.8 % (ref 11–14.5)
GFR SERPLBLD CREATININE-BSD FMLA CKD-EPI: >90 MLS/MIN/1.73/M2
GLOBULIN SER-MCNC: 3.3 GM/DL (ref 2–3.9)
GLUCOSE SERPL-MCNC: 69 MG/DL (ref 70–115)
GLUCOSE UR QL STRIP.AUTO: NEGATIVE
GLUCOSE UR QL STRIP: NEGATIVE
HCT VFR BLD AUTO: 36.9 % (ref 36–48)
HGB BLD-MCNC: 11.9 G/DL (ref 11.8–16)
IMM GRANULOCYTES # BLD AUTO: 0.06 X10(3)/MCL (ref 0–0.03)
IMM GRANULOCYTES NFR BLD AUTO: 0.5 % (ref 0–0.5)
KETONES UR QL STRIP.AUTO: NEGATIVE
KETONES UR QL STRIP: NORMAL
LEUKOCYTE ESTERASE UR QL STRIP.AUTO: NEGATIVE
LEUKOCYTE ESTERASE UR QL STRIP: NEGATIVE
LYMPHOCYTES # BLD AUTO: 1.48 X10(3)/MCL (ref 1.16–3.74)
LYMPHOCYTES NFR BLD AUTO: 13.5 % (ref 20–55)
MCH RBC QN AUTO: 27.2 PG (ref 27–34)
MCHC RBC AUTO-ENTMCNC: 32.2 G/DL (ref 31–37)
MCV RBC AUTO: 84.2 FL (ref 79–99)
MONOCYTES # BLD AUTO: 0.81 X10(3)/MCL (ref 0.24–0.36)
MONOCYTES NFR BLD AUTO: 7.4 % (ref 4.7–12.5)
NEUTROPHILS # BLD AUTO: 8.4 X10(3)/MCL (ref 1.56–6.13)
NEUTROPHILS NFR BLD AUTO: 76.9 % (ref 37–73)
NITRITE UR QL STRIP.AUTO: NEGATIVE
NRBC BLD AUTO-RTO: 0 %
PH UR STRIP.AUTO: 7 [PH]
PH, POC UA: NORMAL
PLATELET # BLD AUTO: 189 X10(3)/MCL (ref 140–371)
PMV BLD AUTO: 9.5 FL (ref 9.4–12.4)
POC BLOOD, URINE: NORMAL
POC NITRATES, URINE: NEGATIVE
POTASSIUM SERPL-SCNC: 3.5 MMOL/L (ref 3.5–5.1)
PROT SERPL-MCNC: 6.7 GM/DL (ref 6.3–8.2)
PROT UR QL STRIP.AUTO: NEGATIVE
PROT UR QL STRIP: NEGATIVE
RBC # BLD AUTO: 4.38 X10(6)/MCL (ref 4–5.1)
RBC UR QL AUTO: NEGATIVE
SODIUM SERPL-SCNC: 134 MMOL/L (ref 135–145)
SP GR UR STRIP.AUTO: 1.01 (ref 1–1.03)
SP GR UR STRIP: NORMAL (ref 1–1.03)
SPECIMEN OUTDATE: NORMAL
UROBILINOGEN UR STRIP-ACNC: 0.2
UROBILINOGEN UR STRIP-ACNC: NORMAL (ref 0.3–2.2)
WBC # SPEC AUTO: 10.93 X10(3)/MCL (ref 4–11.5)

## 2024-04-22 PROCEDURE — 0502F SUBSEQUENT PRENATAL CARE: CPT | Mod: CPTII,,, | Performed by: OBSTETRICS & GYNECOLOGY

## 2024-04-22 PROCEDURE — 63600175 PHARM REV CODE 636 W HCPCS: Performed by: OBSTETRICS & GYNECOLOGY

## 2024-04-22 PROCEDURE — 85025 COMPLETE CBC W/AUTO DIFF WBC: CPT | Performed by: OBSTETRICS & GYNECOLOGY

## 2024-04-22 PROCEDURE — 76818 FETAL BIOPHYS PROFILE W/NST: CPT | Mod: ,,, | Performed by: OBSTETRICS & GYNECOLOGY

## 2024-04-22 PROCEDURE — 80053 COMPREHEN METABOLIC PANEL: CPT | Performed by: OBSTETRICS & GYNECOLOGY

## 2024-04-22 PROCEDURE — 36415 COLL VENOUS BLD VENIPUNCTURE: CPT | Performed by: OBSTETRICS & GYNECOLOGY

## 2024-04-22 PROCEDURE — 81003 URINALYSIS AUTO W/O SCOPE: CPT | Performed by: OBSTETRICS & GYNECOLOGY

## 2024-04-22 PROCEDURE — 25000003 PHARM REV CODE 250: Performed by: OBSTETRICS & GYNECOLOGY

## 2024-04-22 PROCEDURE — 11000001 HC ACUTE MED/SURG PRIVATE ROOM

## 2024-04-22 PROCEDURE — 86901 BLOOD TYPING SEROLOGIC RH(D): CPT | Performed by: OBSTETRICS & GYNECOLOGY

## 2024-04-22 RX ORDER — MISOPROSTOL 100 MCG
25 TABLET ORAL EVERY 4 HOURS PRN
Status: DISCONTINUED | OUTPATIENT
Start: 2024-04-22 | End: 2024-04-25

## 2024-04-22 RX ORDER — SODIUM CHLORIDE, SODIUM LACTATE, POTASSIUM CHLORIDE, CALCIUM CHLORIDE 600; 310; 30; 20 MG/100ML; MG/100ML; MG/100ML; MG/100ML
INJECTION, SOLUTION INTRAVENOUS CONTINUOUS
Status: DISCONTINUED | OUTPATIENT
Start: 2024-04-22 | End: 2024-04-25

## 2024-04-22 RX ORDER — SODIUM CHLORIDE 9 MG/ML
INJECTION, SOLUTION INTRAVENOUS
Status: DISCONTINUED | OUTPATIENT
Start: 2024-04-22 | End: 2024-04-25

## 2024-04-22 RX ORDER — CALCIUM CARBONATE 200(500)MG
500 TABLET,CHEWABLE ORAL 3 TIMES DAILY PRN
Status: DISCONTINUED | OUTPATIENT
Start: 2024-04-22 | End: 2024-04-27 | Stop reason: HOSPADM

## 2024-04-22 RX ORDER — OXYTOCIN 10 [USP'U]/ML
10 INJECTION, SOLUTION INTRAMUSCULAR; INTRAVENOUS ONCE AS NEEDED
Status: DISCONTINUED | OUTPATIENT
Start: 2024-04-22 | End: 2024-04-25

## 2024-04-22 RX ORDER — OXYTOCIN/RINGER'S LACTATE 30/500 ML
334 PLASTIC BAG, INJECTION (ML) INTRAVENOUS ONCE
Status: COMPLETED | OUTPATIENT
Start: 2024-04-22 | End: 2024-04-24

## 2024-04-22 RX ORDER — OXYTOCIN/RINGER'S LACTATE 30/500 ML
95 PLASTIC BAG, INJECTION (ML) INTRAVENOUS ONCE
Status: DISCONTINUED | OUTPATIENT
Start: 2024-04-22 | End: 2024-04-27 | Stop reason: HOSPADM

## 2024-04-22 RX ORDER — MUPIROCIN 20 MG/G
OINTMENT TOPICAL
Status: CANCELLED | OUTPATIENT
Start: 2024-04-22

## 2024-04-22 RX ORDER — MISOPROSTOL 100 UG/1
800 TABLET ORAL ONCE AS NEEDED
Status: DISCONTINUED | OUTPATIENT
Start: 2024-04-22 | End: 2024-04-25

## 2024-04-22 RX ORDER — TERBUTALINE SULFATE 1 MG/ML
0.25 INJECTION SUBCUTANEOUS
Status: DISCONTINUED | OUTPATIENT
Start: 2024-04-22 | End: 2024-04-27 | Stop reason: HOSPADM

## 2024-04-22 RX ORDER — CARBOPROST TROMETHAMINE 250 UG/ML
250 INJECTION, SOLUTION INTRAMUSCULAR
Status: DISCONTINUED | OUTPATIENT
Start: 2024-04-22 | End: 2024-04-25

## 2024-04-22 RX ORDER — METHYLERGONOVINE MALEATE 0.2 MG/ML
200 INJECTION INTRAVENOUS ONCE AS NEEDED
Status: DISCONTINUED | OUTPATIENT
Start: 2024-04-22 | End: 2024-04-25

## 2024-04-22 RX ORDER — OXYTOCIN/RINGER'S LACTATE 30/500 ML
95 PLASTIC BAG, INJECTION (ML) INTRAVENOUS ONCE AS NEEDED
Status: DISCONTINUED | OUTPATIENT
Start: 2024-04-22 | End: 2024-04-25

## 2024-04-22 RX ORDER — HYDROXYZINE PAMOATE 25 MG/1
25 CAPSULE ORAL DAILY PRN
Status: DISCONTINUED | OUTPATIENT
Start: 2024-04-22 | End: 2024-04-27 | Stop reason: HOSPADM

## 2024-04-22 RX ORDER — SIMETHICONE 80 MG
1 TABLET,CHEWABLE ORAL 4 TIMES DAILY PRN
Status: DISCONTINUED | OUTPATIENT
Start: 2024-04-22 | End: 2024-04-27 | Stop reason: HOSPADM

## 2024-04-22 RX ORDER — ACETAMINOPHEN 325 MG/1
650 TABLET ORAL EVERY 6 HOURS PRN
Status: DISCONTINUED | OUTPATIENT
Start: 2024-04-22 | End: 2024-04-27 | Stop reason: HOSPADM

## 2024-04-22 RX ORDER — LIDOCAINE HYDROCHLORIDE 10 MG/ML
10 INJECTION INFILTRATION; PERINEURAL ONCE AS NEEDED
Status: DISCONTINUED | OUTPATIENT
Start: 2024-04-22 | End: 2024-04-27 | Stop reason: HOSPADM

## 2024-04-22 RX ORDER — ONDANSETRON 4 MG/1
8 TABLET, ORALLY DISINTEGRATING ORAL EVERY 8 HOURS PRN
Status: DISCONTINUED | OUTPATIENT
Start: 2024-04-22 | End: 2024-04-25

## 2024-04-22 RX ORDER — OXYTOCIN/RINGER'S LACTATE 30/500 ML
334 PLASTIC BAG, INJECTION (ML) INTRAVENOUS ONCE AS NEEDED
Status: DISCONTINUED | OUTPATIENT
Start: 2024-04-22 | End: 2024-04-25

## 2024-04-22 RX ORDER — BUTORPHANOL TARTRATE 2 MG/ML
1 INJECTION INTRAMUSCULAR; INTRAVENOUS
Status: DISCONTINUED | OUTPATIENT
Start: 2024-04-22 | End: 2024-04-27 | Stop reason: HOSPADM

## 2024-04-22 RX ORDER — TRANEXAMIC ACID 10 MG/ML
1000 INJECTION, SOLUTION INTRAVENOUS EVERY 30 MIN PRN
Status: DISCONTINUED | OUTPATIENT
Start: 2024-04-22 | End: 2024-04-24 | Stop reason: SDUPTHER

## 2024-04-22 RX ORDER — DIPHENOXYLATE HYDROCHLORIDE AND ATROPINE SULFATE 2.5; .025 MG/1; MG/1
2 TABLET ORAL EVERY 6 HOURS PRN
Status: DISCONTINUED | OUTPATIENT
Start: 2024-04-22 | End: 2024-04-25

## 2024-04-22 RX ADMIN — MISOPROSTOL 25 MCG: 100 TABLET ORAL at 08:04

## 2024-04-22 RX ADMIN — SODIUM CHLORIDE, POTASSIUM CHLORIDE, SODIUM LACTATE AND CALCIUM CHLORIDE 500 ML: 600; 310; 30; 20 INJECTION, SOLUTION INTRAVENOUS at 08:04

## 2024-04-22 RX ADMIN — MISOPROSTOL 25 MCG: 100 TABLET ORAL at 04:04

## 2024-04-22 RX ADMIN — HYDROXYZINE PAMOATE 25 MG: 25 CAPSULE ORAL at 08:04

## 2024-04-23 ENCOUNTER — ANESTHESIA (OUTPATIENT)
Dept: SURGERY | Facility: HOSPITAL | Age: 29
End: 2024-04-23
Payer: COMMERCIAL

## 2024-04-23 ENCOUNTER — ANESTHESIA EVENT (OUTPATIENT)
Dept: SURGERY | Facility: HOSPITAL | Age: 29
End: 2024-04-23
Payer: COMMERCIAL

## 2024-04-23 PROBLEM — O99.210 MATERNAL OBESITY AFFECTING PREGNANCY, ANTEPARTUM: Status: ACTIVE | Noted: 2024-04-23

## 2024-04-23 PROBLEM — Z3A.39 39 WEEKS GESTATION OF PREGNANCY: Status: ACTIVE | Noted: 2024-04-23

## 2024-04-23 PROCEDURE — 63600175 PHARM REV CODE 636 W HCPCS: Performed by: NURSE ANESTHETIST, CERTIFIED REGISTERED

## 2024-04-23 PROCEDURE — 10907ZC DRAINAGE OF AMNIOTIC FLUID, THERAPEUTIC FROM PRODUCTS OF CONCEPTION, VIA NATURAL OR ARTIFICIAL OPENING: ICD-10-PCS | Performed by: OBSTETRICS & GYNECOLOGY

## 2024-04-23 PROCEDURE — 59514 CESAREAN DELIVERY ONLY: CPT | Mod: QZ,,, | Performed by: NURSE ANESTHETIST, CERTIFIED REGISTERED

## 2024-04-23 PROCEDURE — 01968 ANES/ANALG CS DLVR NEURAXIAL: CPT | Mod: QZ,,, | Performed by: NURSE ANESTHETIST, CERTIFIED REGISTERED

## 2024-04-23 PROCEDURE — 25000003 PHARM REV CODE 250: Performed by: OBSTETRICS & GYNECOLOGY

## 2024-04-23 PROCEDURE — 63600175 PHARM REV CODE 636 W HCPCS: Performed by: OBSTETRICS & GYNECOLOGY

## 2024-04-23 PROCEDURE — 11000001 HC ACUTE MED/SURG PRIVATE ROOM

## 2024-04-23 PROCEDURE — 62326 NJX INTERLAMINAR LMBR/SAC: CPT | Performed by: NURSE ANESTHETIST, CERTIFIED REGISTERED

## 2024-04-23 RX ORDER — OXYTOCIN/RINGER'S LACTATE 30/500 ML
0-30 PLASTIC BAG, INJECTION (ML) INTRAVENOUS CONTINUOUS
Status: DISCONTINUED | OUTPATIENT
Start: 2024-04-23 | End: 2024-04-25

## 2024-04-23 RX ORDER — LIDOCAINE HCL/EPINEPHRINE/PF 2%-1:200K
VIAL (ML) INJECTION
Status: DISPENSED
Start: 2024-04-23 | End: 2024-04-24

## 2024-04-23 RX ORDER — LIDOCAINE HYDROCHLORIDE 20 MG/ML
INJECTION, SOLUTION EPIDURAL; INFILTRATION; INTRACAUDAL; PERINEURAL
Status: COMPLETED | OUTPATIENT
Start: 2024-04-23 | End: 2024-04-24

## 2024-04-23 RX ORDER — ROPIVACAINE HYDROCHLORIDE 2 MG/ML
INJECTION, SOLUTION EPIDURAL; INFILTRATION CONTINUOUS PRN
Status: DISCONTINUED | OUTPATIENT
Start: 2024-04-23 | End: 2024-04-24

## 2024-04-23 RX ORDER — ROPIVACAINE HYDROCHLORIDE 2 MG/ML
INJECTION, SOLUTION EPIDURAL; INFILTRATION; PERINEURAL
Status: DISPENSED
Start: 2024-04-23 | End: 2024-04-24

## 2024-04-23 RX ADMIN — ROPIVACAINE HYDROCHLORIDE 12 ML/HR: 2 INJECTION, SOLUTION EPIDURAL; INFILTRATION at 01:04

## 2024-04-23 RX ADMIN — Medication 4 MILLI-UNITS/MIN: at 03:04

## 2024-04-23 RX ADMIN — LIDOCAINE HYDROCHLORIDE 5 ML: 20 INJECTION, SOLUTION EPIDURAL; INFILTRATION; INTRACAUDAL; PERINEURAL at 01:04

## 2024-04-23 RX ADMIN — BUTORPHANOL TARTRATE 1 MG: 2 INJECTION, SOLUTION INTRAMUSCULAR; INTRAVENOUS at 06:04

## 2024-04-23 RX ADMIN — MISOPROSTOL 25 MCG: 100 TABLET ORAL at 08:04

## 2024-04-23 RX ADMIN — MISOPROSTOL 25 MCG: 100 TABLET ORAL at 04:04

## 2024-04-23 RX ADMIN — MISOPROSTOL 25 MCG: 100 TABLET ORAL at 12:04

## 2024-04-23 RX ADMIN — SODIUM CHLORIDE, POTASSIUM CHLORIDE, SODIUM LACTATE AND CALCIUM CHLORIDE: 600; 310; 30; 20 INJECTION, SOLUTION INTRAVENOUS at 06:04

## 2024-04-23 RX ADMIN — ACETAMINOPHEN 650 MG: 325 TABLET, FILM COATED ORAL at 05:04

## 2024-04-23 NOTE — INTERVAL H&P NOTE
The patient has been examined and the H&P has been reviewed:    I concur with the findings and no changes have occurred since H&P was written.        Active Hospital Problems    Diagnosis  POA    *39 weeks gestation of pregnancy [Z3A.39]  Not Applicable    Maternal obesity affecting pregnancy, antepartum [O99.210]  Unknown    Rh negative state in antepartum period [O26.899, Z67.91]  Yes      Resolved Hospital Problems   No resolved problems to display.       Admit  CMFM  ASVD  Epidural if desires   I am aware of the plan of care prior to the administration of CRNA anesthesia services.

## 2024-04-23 NOTE — ANESTHESIA PREPROCEDURE EVALUATION
04/23/2024  Bri Eason is a 29 y.o., female.      Pre-op Assessment    I have reviewed the Patient Summary Reports.     I have reviewed the Nursing Notes. I have reviewed the NPO Status.   I have reviewed the Medications.     Review of Systems  Anesthesia Hx:  No problems with previous Anesthesia             Denies Family Hx of Anesthesia complications.    Denies Personal Hx of Anesthesia complications.                    Hematology/Oncology:  Hematology Normal   Oncology Normal                                   EENT/Dental:  EENT/Dental Normal           Cardiovascular:  Cardiovascular Normal Exercise tolerance: good                                           Pulmonary:  Pulmonary Normal                       Renal/:  Renal/ Normal                 Hepatic/GI:  Hepatic/GI Normal                 Musculoskeletal:  Musculoskeletal Normal                OB/GYN/PEDS:    Planned Vaginal Delivery                     Neurological:  Neurology Normal                                      Endocrine:  Endocrine Normal            Dermatological:  Skin Normal    Psych:  Psychiatric History anxiety                 Physical Exam  General: Well nourished, Cooperative, Alert and Oriented    Airway:  Mallampati: II / II  Mouth Opening: Normal  TM Distance: Normal  Tongue: Normal  Neck ROM: Normal ROM    Dental:  Intact        Anesthesia Plan  Type of Anesthesia, risks & benefits discussed:    Anesthesia Type: Epidural  Intra-op Monitoring Plan: Standard ASA Monitors  Post Op Pain Control Plan: multimodal analgesia  Induction:  IV  Airway Plan: Direct  Informed Consent: Informed consent signed with the Patient and all parties understand the risks and agree with anesthesia plan.  All questions answered. Patient consented to blood products? Yes  ASA Score: 3  Day of Surgery Review of History & Physical: H&P Update referred  to the surgeon/provider.I have interviewed and examined the patient. I have reviewed the patient's H&P dated: There are no significant changes.     Ready For Surgery From Anesthesia Perspective.     .

## 2024-04-23 NOTE — NURSING
Dr. Stratton at bedside. SVE performed. Cervix 2.5/50/-3. Cytotec administered vaginally, patient instructed to remain in bed for 20 min. Patient voiced understanding.

## 2024-04-23 NOTE — ANESTHESIA PROCEDURE NOTES
Epidural    Patient location during procedure: OB   Reason for block: primary anesthetic   Reason for block: at surgeon's request, post-op pain management  Diagnosis: Active Labor   Start time: 4/23/2024 12:35 PM  Timeout: 4/23/2024 12:35 PM  End time: 4/23/2024 12:45 PM  Surgery related to: vaginal delivery    Staffing  Performing Provider: Joon Lyons CRNA  Authorizing Provider: Joon Lyons CRNA    Staffing  Performed by: Joon Lyons CRNA  Authorized by: Joon Lyons CRNA        Preanesthetic Checklist  Completed: patient identified, IV checked, site marked, surgical consent, monitors and equipment checked, pre-op evaluation, timeout performed, anesthesia consent given, hand hygiene performed and patient being monitored  Preparation  Patient position: sitting  Prep: ChloraPrep  Reason for block: primary anesthetic   Epidural  Skin Anesthetic: lidocaine 1%  Administration type: single shot  Approach: midline  Interspace: L3-4    Block type: caudal.  Needle and Epidural Catheter  Needle type: Tuohy   Needle gauge: 18  Needle length: 5.0 inches  Catheter type: multi-orifice  Catheter size: 20 G  Insertion Attempts: 1  Test dose: 3 mL of lidocaine 1.5% with Epi 1-to-200,000  Additional Documentation: incremental injection, negative aspiration for heme and CSF and no paresthesia on injection  Needle localization: anatomical landmarks  Assessment  Ease of block: easy  Patient's tolerance of the procedure: comfortable throughout block No inadvertent dural puncture with Tuohy.  Dural puncture not performed with spinal needle    Medications:    Medications: lidocaine (PF) 20 mg/mL (2%) injection - Epidural   5 mL - 4/23/2024 1:10:00 PM

## 2024-04-24 LAB
HCT VFR BLD AUTO: 32.4 % (ref 36–48)
HGB BLD-MCNC: 10.6 G/DL (ref 11.8–16)
RH IMMUNE GLOBULIN: NORMAL
ROSETTE - FMH (FETAL BLEED SCREEN): NORMAL

## 2024-04-24 PROCEDURE — 63600519 RHOGAM PHARM REV CODE 636 ALT 250 W HCPCS: Performed by: OBSTETRICS & GYNECOLOGY

## 2024-04-24 PROCEDURE — 37000009 HC ANESTHESIA EA ADD 15 MINS: Performed by: OBSTETRICS & GYNECOLOGY

## 2024-04-24 PROCEDURE — 59510 CESAREAN DELIVERY: CPT | Mod: GB,,, | Performed by: OBSTETRICS & GYNECOLOGY

## 2024-04-24 PROCEDURE — 36000708 HC OR TIME LEV III 1ST 15 MIN: Performed by: OBSTETRICS & GYNECOLOGY

## 2024-04-24 PROCEDURE — 63600175 PHARM REV CODE 636 W HCPCS: Performed by: OBSTETRICS & GYNECOLOGY

## 2024-04-24 PROCEDURE — 25000003 PHARM REV CODE 250: Performed by: NURSE ANESTHETIST, CERTIFIED REGISTERED

## 2024-04-24 PROCEDURE — 3E0234Z INTRODUCTION OF SERUM, TOXOID AND VACCINE INTO MUSCLE, PERCUTANEOUS APPROACH: ICD-10-PCS | Performed by: OBSTETRICS & GYNECOLOGY

## 2024-04-24 PROCEDURE — 71000033 HC RECOVERY, INTIAL HOUR: Performed by: OBSTETRICS & GYNECOLOGY

## 2024-04-24 PROCEDURE — 3E0DXGC INTRODUCTION OF OTHER THERAPEUTIC SUBSTANCE INTO MOUTH AND PHARYNX, EXTERNAL APPROACH: ICD-10-PCS | Performed by: OBSTETRICS & GYNECOLOGY

## 2024-04-24 PROCEDURE — 85018 HEMOGLOBIN: CPT | Performed by: OBSTETRICS & GYNECOLOGY

## 2024-04-24 PROCEDURE — 85461 HEMOGLOBIN FETAL: CPT | Performed by: OBSTETRICS & GYNECOLOGY

## 2024-04-24 PROCEDURE — 63600175 PHARM REV CODE 636 W HCPCS: Performed by: NURSE ANESTHETIST, CERTIFIED REGISTERED

## 2024-04-24 PROCEDURE — 37000008 HC ANESTHESIA 1ST 15 MINUTES: Performed by: OBSTETRICS & GYNECOLOGY

## 2024-04-24 PROCEDURE — 25000003 PHARM REV CODE 250: Performed by: OBSTETRICS & GYNECOLOGY

## 2024-04-24 PROCEDURE — 36415 COLL VENOUS BLD VENIPUNCTURE: CPT | Performed by: OBSTETRICS & GYNECOLOGY

## 2024-04-24 PROCEDURE — 11000001 HC ACUTE MED/SURG PRIVATE ROOM

## 2024-04-24 PROCEDURE — 36000709 HC OR TIME LEV III EA ADD 15 MIN: Performed by: OBSTETRICS & GYNECOLOGY

## 2024-04-24 RX ORDER — TRANEXAMIC ACID 100 MG/ML
INJECTION, SOLUTION INTRAVENOUS
Status: DISCONTINUED | OUTPATIENT
Start: 2024-04-24 | End: 2024-04-24

## 2024-04-24 RX ORDER — SODIUM CHLORIDE 0.9 G/100ML
IRRIGANT IRRIGATION
Status: DISCONTINUED | OUTPATIENT
Start: 2024-04-24 | End: 2024-04-24 | Stop reason: HOSPADM

## 2024-04-24 RX ORDER — LIDOCAINE HCL/EPINEPHRINE/PF 2%-1:200K
1 VIAL (ML) INJECTION ONCE
Status: DISCONTINUED | OUTPATIENT
Start: 2024-04-24 | End: 2024-04-27 | Stop reason: HOSPADM

## 2024-04-24 RX ORDER — OXYTOCIN 10 [USP'U]/ML
10 INJECTION, SOLUTION INTRAMUSCULAR; INTRAVENOUS ONCE AS NEEDED
Status: DISCONTINUED | OUTPATIENT
Start: 2024-04-24 | End: 2024-04-27 | Stop reason: HOSPADM

## 2024-04-24 RX ORDER — METHYLERGONOVINE MALEATE 0.2 MG/ML
200 INJECTION INTRAVENOUS ONCE AS NEEDED
Status: DISCONTINUED | OUTPATIENT
Start: 2024-04-24 | End: 2024-04-27 | Stop reason: HOSPADM

## 2024-04-24 RX ORDER — OXYTOCIN/RINGER'S LACTATE 30/500 ML
95 PLASTIC BAG, INJECTION (ML) INTRAVENOUS ONCE
Status: DISCONTINUED | OUTPATIENT
Start: 2024-04-24 | End: 2024-04-27 | Stop reason: HOSPADM

## 2024-04-24 RX ORDER — MUPIROCIN 20 MG/G
OINTMENT TOPICAL 2 TIMES DAILY
Status: DISCONTINUED | OUTPATIENT
Start: 2024-04-24 | End: 2024-04-27 | Stop reason: HOSPADM

## 2024-04-24 RX ORDER — DOCUSATE SODIUM 100 MG/1
200 CAPSULE, LIQUID FILLED ORAL 2 TIMES DAILY
Status: DISCONTINUED | OUTPATIENT
Start: 2024-04-24 | End: 2024-04-27 | Stop reason: HOSPADM

## 2024-04-24 RX ORDER — FENTANYL CITRATE 50 UG/ML
INJECTION, SOLUTION INTRAMUSCULAR; INTRAVENOUS
Status: DISCONTINUED | OUTPATIENT
Start: 2024-04-24 | End: 2024-04-24

## 2024-04-24 RX ORDER — ONDANSETRON 4 MG/1
8 TABLET, ORALLY DISINTEGRATING ORAL EVERY 8 HOURS PRN
Status: DISCONTINUED | OUTPATIENT
Start: 2024-04-24 | End: 2024-04-27 | Stop reason: HOSPADM

## 2024-04-24 RX ORDER — HYDROCODONE BITARTRATE AND ACETAMINOPHEN 7.5; 325 MG/1; MG/1
1 TABLET ORAL EVERY 4 HOURS PRN
Status: DISCONTINUED | OUTPATIENT
Start: 2024-04-24 | End: 2024-04-27 | Stop reason: HOSPADM

## 2024-04-24 RX ORDER — ALUMINUM HYDROXIDE, MAGNESIUM HYDROXIDE, AND SIMETHICONE 1200; 120; 1200 MG/30ML; MG/30ML; MG/30ML
30 SUSPENSION ORAL EVERY 6 HOURS PRN
Status: DISCONTINUED | OUTPATIENT
Start: 2024-04-24 | End: 2024-04-27 | Stop reason: HOSPADM

## 2024-04-24 RX ORDER — ACETAMINOPHEN 10 MG/ML
INJECTION, SOLUTION INTRAVENOUS
Status: DISCONTINUED | OUTPATIENT
Start: 2024-04-24 | End: 2024-04-24

## 2024-04-24 RX ORDER — CARBOPROST TROMETHAMINE 250 UG/ML
250 INJECTION, SOLUTION INTRAMUSCULAR
Status: DISCONTINUED | OUTPATIENT
Start: 2024-04-24 | End: 2024-04-27 | Stop reason: HOSPADM

## 2024-04-24 RX ORDER — PRENATAL WITH FERROUS FUM AND FOLIC ACID 3080; 920; 120; 400; 22; 1.84; 3; 20; 10; 1; 12; 200; 27; 25; 2 [IU]/1; [IU]/1; MG/1; [IU]/1; MG/1; MG/1; MG/1; MG/1; MG/1; MG/1; UG/1; MG/1; MG/1; MG/1; MG/1
1 TABLET ORAL DAILY
Status: DISCONTINUED | OUTPATIENT
Start: 2024-04-24 | End: 2024-04-27 | Stop reason: HOSPADM

## 2024-04-24 RX ORDER — HYDROMORPHONE HYDROCHLORIDE 1 MG/ML
1 INJECTION, SOLUTION INTRAMUSCULAR; INTRAVENOUS; SUBCUTANEOUS EVERY 4 HOURS PRN
Status: DISCONTINUED | OUTPATIENT
Start: 2024-04-24 | End: 2024-04-27 | Stop reason: HOSPADM

## 2024-04-24 RX ORDER — ONDANSETRON HYDROCHLORIDE 2 MG/ML
INJECTION, SOLUTION INTRAVENOUS
Status: DISCONTINUED | OUTPATIENT
Start: 2024-04-24 | End: 2024-04-24

## 2024-04-24 RX ORDER — HYDROCORTISONE 25 MG/G
CREAM TOPICAL 3 TIMES DAILY PRN
Status: DISCONTINUED | OUTPATIENT
Start: 2024-04-24 | End: 2024-04-27 | Stop reason: HOSPADM

## 2024-04-24 RX ORDER — SODIUM CHLORIDE 0.9 % (FLUSH) 0.9 %
10 SYRINGE (ML) INJECTION
Status: DISCONTINUED | OUTPATIENT
Start: 2024-04-24 | End: 2024-04-25

## 2024-04-24 RX ORDER — OXYTOCIN/RINGER'S LACTATE 30/500 ML
95 PLASTIC BAG, INJECTION (ML) INTRAVENOUS ONCE AS NEEDED
Status: DISCONTINUED | OUTPATIENT
Start: 2024-04-24 | End: 2024-04-27 | Stop reason: HOSPADM

## 2024-04-24 RX ORDER — ADHESIVE BANDAGE
30 BANDAGE TOPICAL 2 TIMES DAILY PRN
Status: DISCONTINUED | OUTPATIENT
Start: 2024-04-25 | End: 2024-04-27 | Stop reason: HOSPADM

## 2024-04-24 RX ORDER — DIPHENHYDRAMINE HCL 25 MG
25 CAPSULE ORAL EVERY 4 HOURS PRN
Status: DISCONTINUED | OUTPATIENT
Start: 2024-04-24 | End: 2024-04-27 | Stop reason: HOSPADM

## 2024-04-24 RX ORDER — CEFAZOLIN SODIUM 1 G/3ML
INJECTION, POWDER, FOR SOLUTION INTRAMUSCULAR; INTRAVENOUS
Status: DISCONTINUED | OUTPATIENT
Start: 2024-04-24 | End: 2024-04-24 | Stop reason: HOSPADM

## 2024-04-24 RX ORDER — DIPHENOXYLATE HYDROCHLORIDE AND ATROPINE SULFATE 2.5; .025 MG/1; MG/1
2 TABLET ORAL EVERY 6 HOURS PRN
Status: DISCONTINUED | OUTPATIENT
Start: 2024-04-24 | End: 2024-04-27 | Stop reason: HOSPADM

## 2024-04-24 RX ORDER — MORPHINE SULFATE 4 MG/ML
4 INJECTION, SOLUTION INTRAMUSCULAR; INTRAVENOUS
Status: DISCONTINUED | OUTPATIENT
Start: 2024-04-24 | End: 2024-04-27 | Stop reason: HOSPADM

## 2024-04-24 RX ORDER — BISACODYL 10 MG/1
10 SUPPOSITORY RECTAL ONCE AS NEEDED
Status: DISCONTINUED | OUTPATIENT
Start: 2024-04-24 | End: 2024-04-27 | Stop reason: HOSPADM

## 2024-04-24 RX ORDER — OXYTOCIN/RINGER'S LACTATE 30/500 ML
334 PLASTIC BAG, INJECTION (ML) INTRAVENOUS ONCE AS NEEDED
Status: DISCONTINUED | OUTPATIENT
Start: 2024-04-24 | End: 2024-04-27 | Stop reason: HOSPADM

## 2024-04-24 RX ORDER — ROPIVACAINE HYDROCHLORIDE 2 MG/ML
12 INJECTION, SOLUTION EPIDURAL; INFILTRATION CONTINUOUS
Status: DISCONTINUED | OUTPATIENT
Start: 2024-04-24 | End: 2024-04-27 | Stop reason: HOSPADM

## 2024-04-24 RX ORDER — MISOPROSTOL 100 UG/1
800 TABLET ORAL ONCE AS NEEDED
Status: DISCONTINUED | OUTPATIENT
Start: 2024-04-24 | End: 2024-04-27 | Stop reason: HOSPADM

## 2024-04-24 RX ORDER — IBUPROFEN 600 MG/1
600 TABLET ORAL EVERY 6 HOURS
Status: DISCONTINUED | OUTPATIENT
Start: 2024-04-24 | End: 2024-04-27 | Stop reason: HOSPADM

## 2024-04-24 RX ORDER — AMOXICILLIN 250 MG
1 CAPSULE ORAL NIGHTLY PRN
Status: DISCONTINUED | OUTPATIENT
Start: 2024-04-24 | End: 2024-04-27 | Stop reason: HOSPADM

## 2024-04-24 RX ORDER — TRANEXAMIC ACID 10 MG/ML
1000 INJECTION, SOLUTION INTRAVENOUS EVERY 30 MIN PRN
Status: DISCONTINUED | OUTPATIENT
Start: 2024-04-24 | End: 2024-04-27 | Stop reason: HOSPADM

## 2024-04-24 RX ORDER — SIMETHICONE 80 MG
1 TABLET,CHEWABLE ORAL EVERY 6 HOURS PRN
Status: DISCONTINUED | OUTPATIENT
Start: 2024-04-24 | End: 2024-04-27 | Stop reason: HOSPADM

## 2024-04-24 RX ORDER — PROCHLORPERAZINE EDISYLATE 5 MG/ML
5 INJECTION INTRAMUSCULAR; INTRAVENOUS EVERY 6 HOURS PRN
Status: DISCONTINUED | OUTPATIENT
Start: 2024-04-24 | End: 2024-04-27 | Stop reason: HOSPADM

## 2024-04-24 RX ADMIN — ACETAMINOPHEN 1000 MG: 1000 INJECTION, SOLUTION INTRAVENOUS at 02:04

## 2024-04-24 RX ADMIN — TRANEXAMIC ACID 1000 MG: 100 INJECTION, SOLUTION INTRAVENOUS at 02:04

## 2024-04-24 RX ADMIN — DEXTROSE MONOHYDRATE 3 G: 5 INJECTION INTRAVENOUS at 01:04

## 2024-04-24 RX ADMIN — DOCUSATE SODIUM 200 MG: 100 CAPSULE, LIQUID FILLED ORAL at 09:04

## 2024-04-24 RX ADMIN — FENTANYL CITRATE 100 MCG: 50 INJECTION, SOLUTION INTRAMUSCULAR; INTRAVENOUS at 02:04

## 2024-04-24 RX ADMIN — LIDOCAINE HYDROCHLORIDE 5 ML: 20 INJECTION, SOLUTION EPIDURAL; INFILTRATION; INTRACAUDAL; PERINEURAL at 12:04

## 2024-04-24 RX ADMIN — HYDROCODONE BITARTRATE AND ACETAMINOPHEN 1 TABLET: 7.5; 325 TABLET ORAL at 06:04

## 2024-04-24 RX ADMIN — HYDROXYZINE PAMOATE 25 MG: 25 CAPSULE ORAL at 09:04

## 2024-04-24 RX ADMIN — IBUPROFEN 600 MG: 600 TABLET, FILM COATED ORAL at 11:04

## 2024-04-24 RX ADMIN — HYDROCODONE BITARTRATE AND ACETAMINOPHEN 1 TABLET: 7.5; 325 TABLET ORAL at 12:04

## 2024-04-24 RX ADMIN — IBUPROFEN 600 MG: 600 TABLET, FILM COATED ORAL at 05:04

## 2024-04-24 RX ADMIN — AZITHROMYCIN DIHYDRATE 500 MG: 500 INJECTION, POWDER, LYOPHILIZED, FOR SOLUTION INTRAVENOUS at 01:04

## 2024-04-24 RX ADMIN — SODIUM CHLORIDE, POTASSIUM CHLORIDE, SODIUM LACTATE AND CALCIUM CHLORIDE: 600; 310; 30; 20 INJECTION, SOLUTION INTRAVENOUS at 01:04

## 2024-04-24 RX ADMIN — LIDOCAINE HYDROCHLORIDE 5 ML: 20 INJECTION, SOLUTION EPIDURAL; INFILTRATION; INTRACAUDAL; PERINEURAL at 01:04

## 2024-04-24 RX ADMIN — Medication 125 ML/HR: at 02:04

## 2024-04-24 RX ADMIN — ONDANSETRON 4 MG: 2 INJECTION INTRAMUSCULAR; INTRAVENOUS at 01:04

## 2024-04-24 RX ADMIN — PRENATAL VITAMINS-IRON FUMARATE 27 MG IRON-FOLIC ACID 0.8 MG TABLET 1 TABLET: at 09:04

## 2024-04-24 RX ADMIN — HYDROCODONE BITARTRATE AND ACETAMINOPHEN 1 TABLET: 7.5; 325 TABLET ORAL at 05:04

## 2024-04-24 RX ADMIN — HYDROMORPHONE HYDROCHLORIDE 1 MG: 1 INJECTION, SOLUTION INTRAMUSCULAR; INTRAVENOUS; SUBCUTANEOUS at 02:04

## 2024-04-24 RX ADMIN — HUMAN RHO(D) IMMUNE GLOBULIN 300 MCG: 300 INJECTION, SOLUTION INTRAMUSCULAR at 09:04

## 2024-04-24 NOTE — L&D DELIVERY NOTE
[facility]   Section   Operative Note    SUMMARY     Date of Procedure: 2024     Procedure: Procedure(s) (LRB):   SECTION (N/A)    Surgeons and Role:     * Lindy Stratton MD - Primary    Assisting Surgeon: None    Pre-Operative Diagnosis: CAT II NST dystocia of labor.     Post-Operative Diagnosis: Post-Op Diagnosis Codes:     * Failure to progress in second stage of labor [O62.2]    Anesthesia: Spinal/Epidural    Indication: Patient noted to have a CATII NST with no descent over 2 hours 10/100/-2. Minimal improvement with maternal resting. I discussed with patient fetal tracing and she desires primary CS.      Technical Procedures Used: Lower Transverse hysterotomy via Pfannenstiel incision           Description of the Findings of the Procedure: normal appearing uterus, bilateral ovaries and fallopian tubes. See infant information bellow.    Complications: No    Blood Loss: 600mL    Condition: Good    Disposition: PACU - hemodynamically stable.    Attestation: Good    PROCEDURE IN DETAIL:   After verbal consent was obtained the patient was brought to the operating room she was then prepped and draped in normal sterile fashion in the dorsal supine position. An appropriate time out was taken with OR team members.  3g Ancef /500mg Azithromycin given OCTOR.  After confirming adequate anesthesia, a pfannenstiel incision was made 2-3 cm above the pubic symphysis through the skin, subcutaneous tissue down to the fascia. The fascia was then incised with the scalpel and was then extended bilaterally using the curved gordillo scissors.  The rectus muscles were then dissected from the attachment to the fascia superiorly and inferiorly.  The peritoneum was then grasped with 2 hemostats and entered sharply with the Metzenbaum scissors. No adhesions were noted, the incision was then extended.  The bladder blade was then inserted.  The vesicouterine peritoneum was then incised with the  Metzenbaum scissors and the bladder flap was then created. The bladder blade was then reinserted.   The lower uterine segment and position of the fetus was identified.  Low Transverse incision was made through  the lower uterine segment and extended laterally with blunt dissection. Clear fluid noted.  Infant delivered from vertex presentation.  Cord clamped and cut the  was handed to nursery nurse.  Respiratory therapy and Dr. Perkins present.  Cord blood taken, placenta delivered. Gases sent. The uterus was exteriorized and cleared of all clot and debris.  The hysterotomy was repaired with a 1-0 Monocryl in a running locked fashion.  A 2nd layer of the same suture was used to obtain excellent hemostasis via imbrication.  Irrigation was performed. The uterus was returned to the abdomen.  The hysterotomy site was noted to be hemostatic.  Rectus muscles and peritoneum were reapproximated with a 1-0 chromic in interrupted fashion.  Fascia was reapproximated with a 1-0 looped PDS in a running fashion.  Farhad's was reapproximated with a 3-0 plain gut in a running fashion.  Skin was closed with  4-0 Vicryl on a Ryland needle.   Dermabond was applied.  Patient tolerated the procedure well sponge lap needle counts were correct x3.            Specimens:   Specimen (24h ago, onward)       Start     Ordered    24  Specimen to Pathology Gynecology and Obstetrics  Once        References:    Click here for ordering Quick Tip   Question Answer Comment   Procedure Type: Gynecology and Obstetrics    Specimen Source Placenta    Clinical Information:     Release to patient Immediate        24  Specimen to Pathology  RELEASE UPON ORDERING        References:    Click here for ordering Quick Tip   Question:  Release to patient  Answer:  Immediate    24                       Delivery Information for ARELI Eason    Birth information:  YOB: 2022   Time  "of birth: 2:02 AM   Sex: male   Head Delivery Date/Time: 2024  2:02 AM   Delivery type: , Low Transverse   Gestational Age: 39w2d        Delivery Providers    Delivering clinician: Lindy Stratton MD   Provider Role    Jennifer Clark RN Registered Nurse    Niyah Pichardo RN Registered Nurse              Measurements    Weight: 3715 g  Weight (lbs): 8 lb 3 oz  Length: 52.1 cm  Length (in): 20.5"  Head circumference: 36.8 cm         Apgars    Living status: Living  Apgar Component Scores:  1 min.:  5 min.:  10 min.:  15 min.:  20 min.:    Skin color:  0  1  2      Heart rate:  2  2  2      Reflex irritability:  0  2  2      Muscle tone:  0  2  2      Respiratory effort:  0  1  2      Total:  2  8  10      Apgars assigned by: ANA CHAMBERLAIN         Operative Delivery    Forceps attempted?: No  Vacuum extractor attempted?: No         Shoulder Dystocia    Shoulder dystocia present?: No           Presentation    Presentation: Vertex  Position: Middle Occiput Anterior           Interventions/Resuscitation    Method: Bulb Suctioning, Tactile Stimulation, Deep Suctioning, PPV, CPAP       Cord    Vessels: 3 vessels  Complications: Nuchal  Nuchal Intervention: reduced  Nuchal Cord Description: tight nuchal cord  Number of Loops: 2  Delayed Cord Clamping?: No  Cord Clamped Date/Time: 2024  2:02 AM  Cord Blood Disposition: Lab  Gases Sent?: Yes  Stem Cell Collection (by MD): No       Placenta    Placenta delivery date/time: 2024 0203  Placenta removal: Manual removal  Placenta appearance: Intact  Placenta disposition: Pathology           Labor Events:       labor: No     Labor Onset Date/Time: 2024 12:00     Dilation Complete Date/Time: 2024 22:50     Start Pushing Date/Time: 2024 23:00     Rupture Date/Time: 24  1200         Rupture type: ARM (Artificial Rupture)         Fluid Amount:       Fluid Color: Clear       steroids: None     Antibiotics given for GBS: No  "    Induction: misoprostol     Indications for induction:  Elective     Augmentation: amniotomy;oxytocin     Indications for augmentation: Ineffective Contraction Pattern     Labor complications: Fetal Intolerance;Dysfunctional Labor     Additional complications:          Cervical ripening:          Misoprostol          Delivery:      Episiotomy:       Indication for Episiotomy:       Perineal Lacerations:   Repaired:      Periurethral Laceration:   Repaired:     Labial Laceration:   Repaired:     Sulcus Laceration:   Repaired:     Vaginal Laceration:   Repaired:     Cervical Laceration:   Repaired:     Repair suture:       Repair # of packets:       Last Value - EBL - Nursing (mL):       Sum - EBL - Nursing (mL): 0     Last Value - EBL - Anesthesia (mL): 700      Calculated QBL (mL): 1395      Running total QBL (mL): 1395      Vaginal Sweep Performed:       Surgicount Correct:         Other providers:       Anesthesia    Method: Epidural  Analgesics: STADOL INJ          Details (if applicable):  Trial of Labor Yes    Categorization: Primary    Priority: Urgent   Indications for : Fetal heart rate abnormalities   Incision Type: low transverse     Additional  information:  Forceps:    Vacuum:    Breech:    Observed anomalies    Other (Comments):

## 2024-04-24 NOTE — ANESTHESIA POSTPROCEDURE EVALUATION
Anesthesia Post Evaluation    Patient: Bri Eason    Procedure(s) Performed: Procedure(s) (LRB):   SECTION (N/A)    Final Anesthesia Type: epidural      Patient location during evaluation: PACU  Patient participation: Yes- Able to Participate  Level of consciousness: awake and alert, awake and oriented  Post-procedure vital signs: reviewed and stable  Pain management: adequate  Airway patency: patent  GARY mitigation strategies: Preoperative initiation of continuous positive airway pressure (CPAP) or non-invasive positive pressure ventilation (NIPPV), Multimodal analgesia and Use of major conduction anesthesia (spinal/epidural) or peripheral nerve block  PONV status at discharge: No PONV  Anesthetic complications: no      Cardiovascular status: blood pressure returned to baseline  Respiratory status: unassisted, room air and spontaneous ventilation  Hydration status: euvolemic  Follow-up not needed.              Vitals Value Taken Time   /67 24 0100   Temp 36.7 °C (98.1 °F) 24 2100   Pulse 116 24 0100   Resp 18 24 0405   SpO2 99 % 24 0405   Vitals shown include unfiled device data.      No case tracking events are documented in the log.      Pain/Fernanda Score: Pain Rating Prior to Med Admin: 3 (2024  5:32 PM)  Pain Rating Post Med Admin: 0 (2024  6:05 AM)

## 2024-04-24 NOTE — PLAN OF CARE
Patient resting quietly. Pain improved with medication, see MAR. No reports of nausea. Vital signs stable. Meets criteria for transfer.

## 2024-04-24 NOTE — PROGRESS NOTES
04/24/24 0045   TelePeyman Saleem Note - Strip   Strip Reviewed by [place] Nurse? Yes   TeleStork [facility] Note - Communication   [place] Nurse Communicated with Bedside Nurse Regarding: Fetal Status;Other (See Note)  (progress update)   TelePeyman Saleem Note - Notification   Nurse Notified? Yes

## 2024-04-24 NOTE — ANESTHESIA POSTPROCEDURE EVALUATION
Anesthesia Post Evaluation    Patient: Bri Eason    Procedure(s) Performed: Procedure(s) (LRB):   SECTION (N/A)    Final Anesthesia Type: epidural                            Vitals Value Taken Time   /67 24 0100   Temp 36.7 °C (98.1 °F) 24 2100   Pulse 116 24 0100   Resp 18 24 0405   SpO2 99 % 24 0405   Vitals shown include unfiled device data.      No case tracking events are documented in the log.      Pain/Fernanda Score: Pain Rating Prior to Med Admin: 3 (2024  5:32 PM)  Pain Rating Post Med Admin: 0 (2024  6:05 AM)

## 2024-04-25 LAB
BASOPHILS # BLD AUTO: 0.02 X10(3)/MCL (ref 0.01–0.08)
BASOPHILS NFR BLD AUTO: 0.2 % (ref 0.1–1.2)
EOSINOPHIL # BLD AUTO: 0.24 X10(3)/MCL (ref 0.04–0.36)
EOSINOPHIL NFR BLD AUTO: 1.8 % (ref 0.7–7)
ERYTHROCYTE [DISTWIDTH] IN BLOOD BY AUTOMATED COUNT: 15.9 % (ref 11–14.5)
HCT VFR BLD AUTO: 30.1 % (ref 36–48)
HGB BLD-MCNC: 9.6 G/DL (ref 11.8–16)
IMM GRANULOCYTES # BLD AUTO: 0.09 X10(3)/MCL (ref 0–0.03)
IMM GRANULOCYTES NFR BLD AUTO: 0.7 % (ref 0–0.5)
LYMPHOCYTES # BLD AUTO: 1.57 X10(3)/MCL (ref 1.16–3.74)
LYMPHOCYTES NFR BLD AUTO: 11.9 % (ref 20–55)
MCH RBC QN AUTO: 27.1 PG (ref 27–34)
MCHC RBC AUTO-ENTMCNC: 31.9 G/DL (ref 31–37)
MCV RBC AUTO: 85 FL (ref 79–99)
MONOCYTES # BLD AUTO: 0.73 X10(3)/MCL (ref 0.24–0.36)
MONOCYTES NFR BLD AUTO: 5.5 % (ref 4.7–12.5)
NEUTROPHILS # BLD AUTO: 10.56 X10(3)/MCL (ref 1.56–6.13)
NEUTROPHILS NFR BLD AUTO: 79.9 % (ref 37–73)
NRBC BLD AUTO-RTO: 0 %
PLATELET # BLD AUTO: 163 X10(3)/MCL (ref 140–371)
PMV BLD AUTO: 10.1 FL (ref 9.4–12.4)
RBC # BLD AUTO: 3.54 X10(6)/MCL (ref 4–5.1)
WBC # SPEC AUTO: 13.21 X10(3)/MCL (ref 4–11.5)

## 2024-04-25 PROCEDURE — 11000001 HC ACUTE MED/SURG PRIVATE ROOM

## 2024-04-25 PROCEDURE — 36415 COLL VENOUS BLD VENIPUNCTURE: CPT | Performed by: OBSTETRICS & GYNECOLOGY

## 2024-04-25 PROCEDURE — 25000003 PHARM REV CODE 250: Performed by: OBSTETRICS & GYNECOLOGY

## 2024-04-25 PROCEDURE — 85025 COMPLETE CBC W/AUTO DIFF WBC: CPT | Performed by: OBSTETRICS & GYNECOLOGY

## 2024-04-25 RX ORDER — IRON,CARBONYL/ASCORBIC ACID 100-250 MG
1 TABLET ORAL DAILY
Status: DISCONTINUED | OUTPATIENT
Start: 2024-04-25 | End: 2024-04-27 | Stop reason: HOSPADM

## 2024-04-25 RX ADMIN — PRENATAL VITAMINS-IRON FUMARATE 27 MG IRON-FOLIC ACID 0.8 MG TABLET 1 TABLET: at 08:04

## 2024-04-25 RX ADMIN — SIMETHICONE 80 MG: 80 TABLET, CHEWABLE ORAL at 07:04

## 2024-04-25 RX ADMIN — DOCUSATE SODIUM 200 MG: 100 CAPSULE, LIQUID FILLED ORAL at 08:04

## 2024-04-25 RX ADMIN — HYDROCODONE BITARTRATE AND ACETAMINOPHEN 1 TABLET: 7.5; 325 TABLET ORAL at 10:04

## 2024-04-25 RX ADMIN — IBUPROFEN 600 MG: 600 TABLET, FILM COATED ORAL at 06:04

## 2024-04-25 RX ADMIN — IBUPROFEN 600 MG: 600 TABLET, FILM COATED ORAL at 05:04

## 2024-04-25 RX ADMIN — HYDROCODONE BITARTRATE AND ACETAMINOPHEN 1 TABLET: 7.5; 325 TABLET ORAL at 04:04

## 2024-04-25 RX ADMIN — IBUPROFEN 600 MG: 600 TABLET, FILM COATED ORAL at 11:04

## 2024-04-25 RX ADMIN — HYDROCODONE BITARTRATE AND ACETAMINOPHEN 1 TABLET: 7.5; 325 TABLET ORAL at 03:04

## 2024-04-25 NOTE — PROGRESS NOTES
PostPartum Progress Note        Subjective:      Post-Partum Day #1 after uncomplicated Primary CS  delivery d/t CAT II dystocia of labor.    Patient is without complaints. Lochia decreasing. Breast feeding with occasional supplementation with formula. Pain is well controlled. Patient is ambulating and urinating without complications. Tolerating full regular diet. Pain well controlled. QBL is 1395ml and her H&H this am is 9.6 & 30.1. Pt is asymptomatic. Overall mother and baby are doing well. Pt did receive Rhogam per protocol.    Objective:      Temp:  [97.4 °F (36.3 °C)-98.2 °F (36.8 °C)] 98.1 °F (36.7 °C)  Pulse:  [] 89  Resp:  [18-20] 18  BP: (114-129)/(57-75) 122/57    Intake/Output Summary (Last 24 hours) at 2024 1248  Last data filed at 2024 1253  Gross per 24 hour   Intake --   Output 1000 ml   Net -1000 ml     Body mass index is 43.68 kg/m².    General: no acute distress  Abdomen: soft, non-tender, non-distended; Fundus firm and at the umbilicus. Incision: CDI, appropriate TTP. ROBIN    Extremities: non-tender, symmetric, +1 edema    Lab Results   Component Value Date/Time    ABORH A NEG 2024 04:01 PM    ABSCREEN NEG 2024 04:01 PM     Recent Results (from the past 336 hour(s))   CBC with Differential    Collection Time: 24  6:44 AM   Result Value Ref Range    WBC 13.21 (H) 4.00 - 11.50 x10(3)/mcL    Hgb 9.6 (L) 11.8 - 16.0 g/dL    Hct 30.1 (L) 36.0 - 48.0 %    Platelet 163 140 - 371 x10(3)/mcL   CBC with Differential    Collection Time: 24  3:59 PM   Result Value Ref Range    WBC 10.93 4.00 - 11.50 x10(3)/mcL    Hgb 11.9 11.8 - 16.0 g/dL    Hct 36.9 36.0 - 48.0 %    Platelet 189 140 - 371 x10(3)/mcL          Assessment:     29 y.o.  S/P CS Post-Partum Day #1  - Doing Well; postpartum anemia     Plan:     1. Continue routine postpartum care ambulation encouraged   2. Plan for D/C in AM  3. Breast Feeding encouraged, lactation consult  4. Rhogam received per  protocol   5. Icar for asymptomatic anemia

## 2024-04-26 PROCEDURE — 25000003 PHARM REV CODE 250

## 2024-04-26 PROCEDURE — 11000001 HC ACUTE MED/SURG PRIVATE ROOM

## 2024-04-26 PROCEDURE — 25000003 PHARM REV CODE 250: Performed by: OBSTETRICS & GYNECOLOGY

## 2024-04-26 RX ADMIN — SIMETHICONE 80 MG: 80 TABLET, CHEWABLE ORAL at 04:04

## 2024-04-26 RX ADMIN — DOCUSATE SODIUM 200 MG: 100 CAPSULE, LIQUID FILLED ORAL at 10:04

## 2024-04-26 RX ADMIN — DOCUSATE SODIUM 200 MG: 100 CAPSULE, LIQUID FILLED ORAL at 09:04

## 2024-04-26 RX ADMIN — HYDROCODONE BITARTRATE AND ACETAMINOPHEN 1 TABLET: 7.5; 325 TABLET ORAL at 11:04

## 2024-04-26 RX ADMIN — IBUPROFEN 600 MG: 600 TABLET, FILM COATED ORAL at 05:04

## 2024-04-26 RX ADMIN — IBUPROFEN 600 MG: 600 TABLET, FILM COATED ORAL at 11:04

## 2024-04-26 RX ADMIN — PRENATAL VITAMINS-IRON FUMARATE 27 MG IRON-FOLIC ACID 0.8 MG TABLET 1 TABLET: at 09:04

## 2024-04-26 RX ADMIN — Medication 1 TABLET: at 09:04

## 2024-04-26 RX ADMIN — HYDROCODONE BITARTRATE AND ACETAMINOPHEN 1 TABLET: 7.5; 325 TABLET ORAL at 10:04

## 2024-04-26 NOTE — PROGRESS NOTES
PostPartum Progress Note        Subjective:      Post-Partum Day #2 after uncomplicated Primary CS  delivery d/t CAT II dystocia of labor     Patient is without complaints. Lochia decreasing. Breast/bottle feeding. Pain is well controlled. Patient is ambulating and urinating without complications. Tolerating full regular diet. Pain well controlled. Overall mother and baby are doing well. Pt did receive Rhogam per protocol. She is currently on Icar for asymptomatic anemia.     Objective:      Temp:  [97.3 °F (36.3 °C)-98.2 °F (36.8 °C)] 97.7 °F (36.5 °C)  Pulse:  [84-99] 84  Resp:  [18-20] 20  BP: (109-135)/(57-74) 109/64  No intake or output data in the 24 hours ending 24 0824  Body mass index is 43.68 kg/m².    General: no acute distress  Abdomen: soft, non-tender, non-distended; Fundus firm and at the umbilicus. Incision: CDI, appropriate TTP. ROBIN    Extremities: non-tender, symmetric, +2 edema    Lab Results   Component Value Date/Time    ABORH A NEG 2024 04:01 PM    ABSCREEN NEG 2024 04:01 PM     Recent Results (from the past 336 hour(s))   CBC with Differential    Collection Time: 24  6:44 AM   Result Value Ref Range    WBC 13.21 (H) 4.00 - 11.50 x10(3)/mcL    Hgb 9.6 (L) 11.8 - 16.0 g/dL    Hct 30.1 (L) 36.0 - 48.0 %    Platelet 163 140 - 371 x10(3)/mcL   CBC with Differential    Collection Time: 24  3:59 PM   Result Value Ref Range    WBC 10.93 4.00 - 11.50 x10(3)/mcL    Hgb 11.9 11.8 - 16.0 g/dL    Hct 36.9 36.0 - 48.0 %    Platelet 189 140 - 371 x10(3)/mcL          Assessment:     29 y.o.  S/P CS Post-Partum Day #2; postpartum anemia  - Doing Well      Plan:     1. Continue routine postpartum care ambulation encouraged   2. Plan for D/C in AM d/t baby requiring bili lights  3. Breast Feeding encouraged, lactation consult  4. Rhogam per protocol   5. Continue Icar for asymptomatic anemia

## 2024-04-27 VITALS
RESPIRATION RATE: 18 BRPM | OXYGEN SATURATION: 100 % | SYSTOLIC BLOOD PRESSURE: 128 MMHG | WEIGHT: 270.63 LBS | DIASTOLIC BLOOD PRESSURE: 76 MMHG | TEMPERATURE: 99 F | HEIGHT: 66 IN | BODY MASS INDEX: 43.49 KG/M2 | HEART RATE: 92 BPM

## 2024-04-27 PROBLEM — O36.8390 ANTEPARTUM NON-REASSURING FETAL HEART RATE OR RHYTHM AFFECTING CARE OF MOTHER: Status: ACTIVE | Noted: 2024-04-27

## 2024-04-27 PROCEDURE — 25000003 PHARM REV CODE 250: Performed by: OBSTETRICS & GYNECOLOGY

## 2024-04-27 RX ORDER — IBUPROFEN 600 MG/1
600 TABLET ORAL EVERY 6 HOURS PRN
Qty: 30 TABLET | Refills: 0 | Status: SHIPPED | OUTPATIENT
Start: 2024-04-27 | End: 2024-06-06

## 2024-04-27 RX ORDER — HYDROCODONE BITARTRATE AND ACETAMINOPHEN 7.5; 325 MG/1; MG/1
1 TABLET ORAL EVERY 4 HOURS PRN
Qty: 12 TABLET | Refills: 0 | Status: SHIPPED | OUTPATIENT
Start: 2024-04-27 | End: 2024-05-09

## 2024-04-27 RX ADMIN — IBUPROFEN 600 MG: 600 TABLET, FILM COATED ORAL at 12:04

## 2024-04-27 RX ADMIN — HYDROCODONE BITARTRATE AND ACETAMINOPHEN 1 TABLET: 7.5; 325 TABLET ORAL at 01:04

## 2024-04-27 RX ADMIN — IBUPROFEN 600 MG: 600 TABLET, FILM COATED ORAL at 05:04

## 2024-04-27 NOTE — DISCHARGE SUMMARY
[facility]  Obstetrics  Discharge Summary      Patient Name: Bri Eason  MRN: [id number]  Admission Date: 2024  Hospital Length of Stay: 5 days  Discharge Date and Time:  2024 12:46 PM  Attending Physician: Lindy Stratton MD   Discharging Provider: RACHEL HENDRICKS MD  Primary Care Provider: Alicia Cisneros MD    HPI: S/P PRIMARY C/S ON  BY DR. STRATTON    Procedure(s) (LRB):   SECTION (N/A)     Hospital Course: The patient is doing well this morning.  She offers no new complaints. She denies excessive vaginal bleeding, unequal lower extremity swelling, chest pain, and shortness of breath, She denies PIH symptoms. She is ambulating and voiding without difficulties.  She is passing flatus. She has been afebrile.  The infant is doing well. She feels is ready for discharge home.     Vitals:    24 2243 24 0000 24 0500 24 0800   BP:  127/66 131/77 128/76   BP Location:  Left arm Left arm Left arm   Patient Position:  Sitting Sitting Sitting   Pulse:  92     Resp: 18 18     Temp:  98 °F (36.7 °C) 97.5 °F (36.4 °C) 98.6 °F (37 °C)   TempSrc:  Oral Oral    SpO2:       Weight:       Height:            Physical Exam  Vitals and nursing note reviewed. Exam conducted with a chaperone present.   Constitutional:       Appearance: Normal appearance.   Cardiovascular:      Rate and Rhythm: Normal rate and regular rhythm.   Pulmonary:      Effort: Pulmonary effort is normal.   Abdominal:      General: Abdomen is flat.      Palpations: Abdomen is soft.      Comments: INCISION CLEAN, DRY, INTACT   Genitourinary:     Comments: SCANT LOCHIA  Musculoskeletal:         General: Normal range of motion.   Skin:     General: Skin is warm and dry.   Neurological:      General: No focal deficit present.      Mental Status: She is alert and oriented to person, place, and time.   Psychiatric:         Mood and Affect: Mood normal.         Behavior: Behavior normal.       "  Lab Results   Component Value Date    WBC 13.21 (H) 2024    HGB 9.6 (L) 2024    HCT 30.1 (L) 2024    MCV 85.0 2024     2024      A NEG, received Rhogam     Consults (From admission, onward)          Status Ordering Provider     Inpatient consult to Anesthesiology  Once        Provider:  (Not yet assigned)    Acknowledged LINDY STRATTON            Final Active Diagnoses:    Diagnosis Date Noted POA    PRINCIPAL PROBLEM:  Antepartum non-reassuring fetal heart rate or rhythm affecting care of mother [O36.8390] 2024 Yes    Failure to progress in second stage of labor [O62.2] 2024 No    Postpartum anemia [O90.81] 2024 No    39 weeks gestation of pregnancy [Z3A.39] 2024 Not Applicable    Maternal obesity affecting pregnancy, antepartum [O99.210] 2024 Yes    Rh negative state in antepartum period [O26.899, Z67.91] 10/24/2023 Yes      Problems Resolved During this Admission:      Feeding Method: breast    Immunizations       Date Immunization Status Dose Route/Site Given by    24 0919 Rho (D) Immune Globulin - IM Given 300 mcg Intramuscular/Right arm Kelsie Stoddard RN    24 MMR Incomplete 0.5 mL Subcutaneous/     24 Tdap Incomplete 0.5 mL Intramuscular/             Delivery:    Episiotomy:     Lacerations:     Repair suture:     Repair # of packets:     Blood loss (ml):       Birth information:  YOB: 2022   Time of birth: 2:02 AM   Sex: male   Delivery type: , Low Transverse   Gestational Age: 39w2d     Measurements    Weight: 3715 g  Weight (lbs): 8 lb 3 oz  Length: 52.1 cm  Length (in): 20.5"  Head circumference: 36.8 cm         Delivery Clinician: Delivery Providers    Delivering clinician: Lindy Stratton MD   Provider Role    Jennifer Clark RN Registered Nurse    Niyah Pichardo RN Registered Nurse             Additional  information:  Forceps:    Vacuum:    Breech:    Observed anomalies  "     Living?:     Apgars    Living status: Living  Apgar Component Scores:  1 min.:  5 min.:  10 min.:  15 min.:  20 min.:    Skin color:  0  1  2      Heart rate:  2  2  2      Reflex irritability:  0  2  2      Muscle tone:  0  2  2      Respiratory effort:  0  1  2      Total:  2  8  10      Apgars assigned by: ANA CHAMBERLAIN         Placenta: Delivered:       appearance  Pending Diagnostic Studies:       Procedure Component Value Units Date/Time    Drug Screen, Urine [[id number]]     Order Status: Sent Lab Status: No result     Specimen: Urine     Specimen to Pathology [[id number]] Collected: 24 0216    Order Status: Sent Lab Status: No result     Specimen: Tissue from Placenta     Specimen to Pathology Gynecology and Obstetrics [[id number]] Collected: 24    Order Status: Sent Lab Status: No result     Specimen: Tissue from Placenta             Discharged Condition: good    Disposition: Home or Self Care    Follow Up:   Follow-up Information       Lindy Stratton MD Follow up.    Specialty: Obstetrics and Gynecology  Why: The patient will followup on  @ 10:50 AM.  Contact information:  [address] [place] [id number] [phone]                           Patient Instructions:      Diet Adult Regular     Pelvic Rest     No dressing needed     Notify your health care provider if you experience any of the following:  temperature >100.4     Notify your health care provider if you experience any of the following:  persistent nausea and vomiting or diarrhea     Notify your health care provider if you experience any of the following:  severe uncontrolled pain     Notify your health care provider if you experience any of the following:  redness, tenderness, or signs of infection (pain, swelling, redness, odor or green/yellow discharge around incision site)     Notify your health care provider if you experience any of the following:  difficulty breathing or increased cough     Notify your  health care provider if you experience any of the following:  severe persistent headache     Notify your health care provider if you experience any of the following:  worsening rash     Notify your health care provider if you experience any of the following:  persistent dizziness, light-headedness, or visual disturbances     Notify your health care provider if you experience any of the following:  increased confusion or weakness     Notify your health care provider if you experience any of the following:     Medications:  Current Discharge Medication List        START taking these medications    Details   HYDROcodone-acetaminophen (NORCO) 7.5-325 mg per tablet Take 1 tablet by mouth every 4 (four) hours as needed for Pain.  Qty: 12 tablet, Refills: 0    Comments: Quantity prescribed more than 7 day supply? No      ibuprofen (ADVIL,MOTRIN) 600 MG tablet Take 1 tablet (600 mg total) by mouth every 6 (six) hours as needed for Pain.  Qty: 30 tablet, Refills: 0           CONTINUE these medications which have NOT CHANGED    Details   hydrOXYzine pamoate (VISTARIL) 25 MG Cap TAKE 1 CAPSULE(25 MG) BY MOUTH EVERY 6 HOURS AS NEEDED FOR ANXIETY  Qty: 30 capsule, Refills: 0    Associated Diagnoses: Anxiety in pregnancy, antepartum      iron-vitamin C 100-250 mg, ICAR-C, (ICAR-C) 100-250 mg Tab Take 1 tablet by mouth once daily.  Qty: 30 tablet, Refills: 3    Associated Diagnoses: Anemia, unspecified type      pantoprazole (PROTONIX) 40 MG tablet Take 1 tablet (40 mg total) by mouth nightly.  Qty: 30 tablet, Refills: 1    Associated Diagnoses: Gastroesophageal reflux in pregnancy      PRENATAL 28 mg iron- 800 mcg Tab Take 1 tablet by mouth.             RACHEL HENDRICKS MD  Obstetrics  [facility]

## 2024-04-29 ENCOUNTER — TELEPHONE (OUTPATIENT)
Dept: OBSTETRICS AND GYNECOLOGY | Facility: CLINIC | Age: 29
End: 2024-04-29
Payer: COMMERCIAL

## 2024-04-29 NOTE — TELEPHONE ENCOUNTER
"Patient phoned with c/o C/S incision  with "a little" blood when wiping area after taking shower. Denies fever, redness or swelling.     Per KB schedule appointment on tomorrow, Nurse visit for evaluation. Verbalized understanding. ED precautions given.   "

## 2024-04-30 ENCOUNTER — CLINICAL SUPPORT (OUTPATIENT)
Dept: OBSTETRICS AND GYNECOLOGY | Facility: CLINIC | Age: 29
End: 2024-04-30
Payer: COMMERCIAL

## 2024-04-30 VITALS
HEIGHT: 66 IN | SYSTOLIC BLOOD PRESSURE: 126 MMHG | BODY MASS INDEX: 40.25 KG/M2 | WEIGHT: 250.44 LBS | DIASTOLIC BLOOD PRESSURE: 70 MMHG

## 2024-04-30 DIAGNOSIS — Z51.89 ENCOUNTER FOR WOUND CARE: Primary | ICD-10-CM

## 2024-04-30 NOTE — PROGRESS NOTES
"    Chief Complaint     Wound Check ( 2024. )    HPI:     Patient is a 29 y.o.  presents today for Wound check     LMP: 23  Frequency: monthly  Cycle Length: 5 days   Flow: heavy to moderate  Intermenstrual Bleeding: No  Postcoital Bleeding: No  Dysmenorrhea: Yes. moderate  Sexually Active: yes   Dyspareunia: No  Contraception: no, pregnant  H/o STI: No   Last pap:  per Pt  H/o abnl pap: No   Colposcopy      Review of Systems:       Review of Systems     Physical Exam:    /70   Ht 5' 6" (1.676 m)   Wt 113.6 kg (250 lb 7.1 oz)   LMP 2023   Breastfeeding Yes   BMI 40.42 kg/m²     Physical Exam     No signs or symptoms of infection noted to surgical incision. Staples removed and steri-strips applied. Patient tolerated well    Wound dry and clean free of signs of infection, Steri strips applied to left side.         Plan:   There are no diagnoses linked to this encounter.      Patient doing well today with no complaints.    Instructed to keep surgical incision clean and dry.   Shower bathe only until steri-strips fall off. Notify MD of signs/symptoms of infection reviewed with patient.   ED precautions given. Patient verbalized understanding.    RTC for postpartum exam.    "

## 2024-06-04 NOTE — PROGRESS NOTES
Chief Complaint:  Postpartum Care    History of Present Illness:   Bri Eason is a 29 y.o. female  status post CS (24) presents for her 6 week postpartum visit. Denies any complaints. Infant doing well, bottle feeding. Lochia resolved. Mood is good. Desires to discuss contraceptive management.       Gyn History:  Menstrual History   Cycle: No  Menarche Age: 13 years  [place]  Sexually Active: No  STI History: No  Contraception: No  Menopause  Menopause Age: 2 years  Breast History  Last Breast Imaging Date: No  History of Breast Biopsy: No  Pap History   Last pap date:  ( NIL patient)  Result: Normal  History of Abnormal Pap: No  HPV Vaccine Completed: No (0/3)      Review of Systems:  General/Constitutional: Chills denies. Fatigue/weakness denies. Fever denies. Night sweats denies. Hot flashes denies  Gastrointestinal: Abdominal pain denies. Blood in stool denies. Constipation denies. Diarrhea denies. Heartburn denies. Nausea denies. Vomiting denies   Genitourinary: Incontinence denies. Blood in urine denies. Frequent urination denies. Urgency denies. Painful urination denies. Nocturia denies   Gynecologic: Irregular menses denies. Heavy bleeding  denies. Painful menses denies. Vaginal discharge denies.Vaginal odor denies. Vaginal itching/Irritation denies. Vaginal lesion denies.  Pelvic pain denies. Decreased libido denies. Vulvar lesion denies. Prolapse of genital organs denies. Painful intercourse denies. Postcoital bleeding denies   Psychiatric: Mood lability denies. Depressed mood denies. Suicidal thoughts denies. Anxiety denies. Overwhelmed denies. Appetite normal. Energy level normal     OB History    Para Term  AB Living   1 1 1 0 0 1   SAB IAB Ectopic Multiple Live Births   0 0 0 0 1      # 1 - Date: 24, Sex: Male, Weight: 3.715 kg (8 lb 3 oz), GA: 39w2d, Type: , Low Transverse, Apgar1: 2, Apgar5: 8, Living: Living, Birth Comments: None      Past  "Medical History:   Diagnosis Date    Anemia 2/20/2024    Anxiety     Depression          Current Outpatient Medications:     hydrOXYzine pamoate (VISTARIL) 25 MG Cap, TAKE 1 CAPSULE(25 MG) BY MOUTH EVERY 6 HOURS AS NEEDED FOR ANXIETY, Disp: 30 capsule, Rfl: 0      Physical Exam:  /72   Temp 97.2 °F (36.2 °C)   Ht 5' 6" (1.676 m)   Wt 114.5 kg (252 lb 6.4 oz)   LMP 07/24/2023   Breastfeeding No   BMI 40.74 kg/m²      Chaperone present.      Constitutional: General appearance: healthy, well-nourished and well-developed  Psychiatric: Orientation to time, place and person. Normal mood and affect and active, alert   Breast: Inspection/palpation: no tenderness, masses, skin changes or abnormal secretions   Abdomen:   Auscultation/Inspection/Palpation: No tenderness or masses. Soft, nondistended   Inspection of incision site: well healed, clean, dry, intact and non-tender    Hernia: no palpable hernia         Assessment/Plan:  1. Postpartum exam  Patient doing well  Diet exercise encouraged  Multivitamin    2. Encounter for contraceptive management  Discussed with patient contraceptive options including natural family planning, barrier, oral contraceptives, patch, NuvaRing, Depo-Provera, Nexplanon, IUDs, abstinence.       Safe sex education     Discussed with patient that birth control options discussed above do not protect against STDs.    Patient desires Mirena  Benefits verifications  Will call when device arrives  Desires back up birth control  Rx: Sprintec, #28  RTC for insertion        This note was transcribed by Rebeca Trujillo MA. There may be transcription errors as a result, however minimal. Effort has been made to ensure accuracy of transcription, but any obvious errors or omissions should be clarified with the author of the document.       "

## 2024-06-06 ENCOUNTER — POSTPARTUM VISIT (OUTPATIENT)
Dept: OBSTETRICS AND GYNECOLOGY | Facility: CLINIC | Age: 29
End: 2024-06-06
Payer: COMMERCIAL

## 2024-06-06 VITALS
TEMPERATURE: 97 F | HEIGHT: 66 IN | BODY MASS INDEX: 40.56 KG/M2 | SYSTOLIC BLOOD PRESSURE: 122 MMHG | DIASTOLIC BLOOD PRESSURE: 72 MMHG | WEIGHT: 252.38 LBS

## 2024-06-06 DIAGNOSIS — Z30.9 ENCOUNTER FOR CONTRACEPTIVE MANAGEMENT, UNSPECIFIED TYPE: ICD-10-CM

## 2024-06-06 LAB
B-HCG UR QL: NEGATIVE
CTP QC/QA: YES

## 2024-06-06 PROCEDURE — 81025 URINE PREGNANCY TEST: CPT | Mod: ,,, | Performed by: OBSTETRICS & GYNECOLOGY

## 2024-06-06 PROCEDURE — 0503F POSTPARTUM CARE VISIT: CPT | Mod: CPTII,,, | Performed by: OBSTETRICS & GYNECOLOGY

## 2024-06-06 RX ORDER — NORGESTIMATE AND ETHINYL ESTRADIOL 0.25-0.035
1 KIT ORAL DAILY
Qty: 28 TABLET | Refills: 0 | Status: SHIPPED | OUTPATIENT
Start: 2024-06-06 | End: 2024-06-13

## 2024-06-12 NOTE — PROCEDURES
"Chief Complaint:  Mirena Insertion    History of Present Illness:   Bri Eason is a 29 y.o.  who presents today for Mirena insertion.       Gyn History:  Menstrual History   Cycle: No  Menarche Age: 13 years  [place]  Sexually Active: Yes  Sexual Orientation: heterosexual  Postcoital Bleeding: No  Dyspareunia: No  STI History: No  Contraception: No  Menopause  Menopause Age: 2 years  Breast History  Last Breast Imaging Date: No  History of Breast Biopsy: No  Pap History   Last pap date:  ( NIL per patient)  Result: Normal  History of Abnormal Pap: No  HPV Vaccine Completed: No (0/3)        Review of Systems:  General/Constitutional: Chills denies. Fatigue/weakness denies. Fever denies. Night sweats denies. Hot flashes denies  Gynecologic: Irregular menses denies. Heavy bleeding denies. Painful menses denies. Vaginal discharge denies. Vaginal odor denies. Vaginal itching/Irritation denies. Vaginal lesion denies. Pelvic pain denies. Decreased libido denies. Vulvar lesion denies. Prolapse of genital organs denies. Painful intercourse denies. Postcoital bleeding denies   Psychiatric: Mood lability denies. Depressed mood denies. Suicidal thoughts denies. Anxiety denies. Overwhelmed denies. Appetite normal. Energy level normal      OB History    Para Term  AB Living   1 1 1 0 0 1   SAB IAB Ectopic Multiple Live Births   0 0 0 0 1      # 1 - Date: 24, Sex: Male, Weight: 3.715 kg (8 lb 3 oz), GA: 39w2d, Type: , Low Transverse, Apgar1: 2, Apgar5: 8, Living: Living, Birth Comments: None        Current Outpatient Medications:     hydrOXYzine pamoate (VISTARIL) 25 MG Cap, TAKE 1 CAPSULE(25 MG) BY MOUTH EVERY 6 HOURS AS NEEDED FOR ANXIETY, Disp: 30 capsule, Rfl: 0    Physical Exam:  /72   Pulse 70   Resp 20   Ht 5' 6" (1.676 m)   Wt 115.2 kg (254 lb)   BMI 41.00 kg/m²     Chaperone present.    Constitutional: General appearance: healthy, well-nourished and " well-developed  Psychiatric: Orientation to time, place and person. Normal mood and affect and active, alert  Abdomen: Auscultation/Inspection/Palpation: soft, NT, nondistended   Female Genitalia:  Vulva: no masses, atrophy or lesions  Bladder/Urethra: no urethral discharge or mass, normal meatus, bladder   non-distended.  Vagina: no tenderness, erythema, cystocele, rectocele, abnormal  vaginal discharge, or vesicle(s) or ulcers   Cervix: no discharge or cervical motion tenderness and grossly normal  Uterus: normal size and shape and midline, non-tender, and no uterine   prolapse.  Adnexa/Parametria: no parametrial tenderness or mass, no adnexal tenderness  or ovarian mass.    Procedure:   After having reviewed the contraindications, side effects, and risks and benefits of all major options for reversible contraception, the patient chose the IUD for contraception. We discussed the risks of insertion, pelvic infection, and the possibility of failure. Patient stated that she has a good understanding of all we discussed, and all questions were answered regarding IUD use. Patient signed consent form.   Pelvic examination was normal. Pap smear is current. Urine pregnancy test negative.  With the patient in the dorsal lithotomy position, a speculum was placed in the vagina. The cervix and vagina were prepped with Betadine, the cervix was stabilized with a tenaculum, and the uterus was sounded to a depth of 7 cm. The IUD was then placed in the endometrial cavity as directed without complications. The strings were trimmed to approximately to 2 cm.  Patient tolerated procedure well.      Assessment/Plan:  1. Encounter for IUD insertion  Explained to patient, and options for contraceptive including natural family planning, barrier methods, oral contraceptives, patch, NuvaRing, Depo-Provera, Nexplanon, IUD, and sterilization     Patient desires Mriena IUD insertion.  Risk factors reviewed and not present.    HCG negative.      Tolerated well.       Discussed cramping and bleeding for 6-8 weeks.  If breakthrough bleeding continues after 3 months add back or change may be necessary.       A second form of birth control should be used for the first 10 days after insertion.       Advised patient that smoking is harmful due to increased risk of stroke, heart attack, and blood clots when taking contraceptive hormones. Patient is to contact us immediately if she experiences severe abdominal pain, severe chest pain, severe headaches, eye visual changes, severe leg pain, or shortness of breath       Call with fever, foul discharge, or intense abdominal pain. Ibuprofen for cramping.       Return to clinic 3 weeks for string check.     Reminded Mirena is no longer effective after 8 years      Consent signed and time out performed         This note was transcribed by Rebeca Trujillo MA. There may be transcription errors as a result, however minimal. Effort has been made to ensure accuracy of transcription, but any obvious errors or omissions should be clarified with the author of the document.

## 2024-06-13 ENCOUNTER — PROCEDURE VISIT (OUTPATIENT)
Dept: OBSTETRICS AND GYNECOLOGY | Facility: CLINIC | Age: 29
End: 2024-06-13
Payer: COMMERCIAL

## 2024-06-13 VITALS
BODY MASS INDEX: 40.82 KG/M2 | DIASTOLIC BLOOD PRESSURE: 70 MMHG | WEIGHT: 254 LBS | RESPIRATION RATE: 20 BRPM | HEART RATE: 70 BPM | SYSTOLIC BLOOD PRESSURE: 134 MMHG | HEIGHT: 66 IN

## 2024-06-13 DIAGNOSIS — Z30.430 ENCOUNTER FOR IUD INSERTION: Primary | ICD-10-CM

## 2024-06-13 LAB
B-HCG UR QL: NEGATIVE
CTP QC/QA: YES

## 2024-06-13 PROCEDURE — 81025 URINE PREGNANCY TEST: CPT | Mod: ,,, | Performed by: OBSTETRICS & GYNECOLOGY

## 2024-06-13 PROCEDURE — 58300 INSERT INTRAUTERINE DEVICE: CPT | Mod: ,,, | Performed by: OBSTETRICS & GYNECOLOGY

## (undated) DEVICE — DRAPE CESAREAN 98X123.5X77 STR

## (undated) DEVICE — ADHESIVE DERMABOND ADVANCED

## (undated) DEVICE — TOWEL OR DISP STRL BLUE 4/PK

## (undated) DEVICE — DRAPE HALF SURGICAL 40X58IN

## (undated) DEVICE — CHLORAPREP W TINT 26ML APPL

## (undated) DEVICE — SUT 3/0 27IN COATED VICRYL

## (undated) DEVICE — TRAY CATH URETHRAL FOLEY 16FR

## (undated) DEVICE — UNDERPAD ULTRASORB 300LB 30X36

## (undated) DEVICE — SUT 0 27IN CHROMIC GUT CT-1

## (undated) DEVICE — SUT MONOCRYL CT 0 36IN

## (undated) DEVICE — SUT PDS II 96IN XLH TAPER

## (undated) DEVICE — SUT VICRYL 3-0 CTX 36IN

## (undated) DEVICE — SUT PLAIN GUT 3-0 CTX 27

## (undated) DEVICE — UNDERGLOVES BIOGEL PI SZ 7 LF

## (undated) DEVICE — SPONGE LAP 18X18 PREWASHED

## (undated) DEVICE — PAD SURFACE PREP 17

## (undated) DEVICE — GLOVE PROTEXIS PI SYN SURG 6.5

## (undated) DEVICE — SYR 10CC LUER LOCK

## (undated) DEVICE — SOL LAC RINGERS 1000ML INJ

## (undated) DEVICE — PACK C-SECTION CUSTOM